# Patient Record
Sex: MALE | Race: WHITE | NOT HISPANIC OR LATINO | Employment: FULL TIME | URBAN - METROPOLITAN AREA
[De-identification: names, ages, dates, MRNs, and addresses within clinical notes are randomized per-mention and may not be internally consistent; named-entity substitution may affect disease eponyms.]

---

## 2017-01-01 ENCOUNTER — OFFICE VISIT (OUTPATIENT)
Dept: URGENT CARE | Facility: CLINIC | Age: 57
End: 2017-01-01
Payer: COMMERCIAL

## 2017-01-01 ENCOUNTER — HOSPITAL ENCOUNTER (EMERGENCY)
Facility: MEDICAL CENTER | Age: 57
End: 2017-01-01
Attending: EMERGENCY MEDICINE
Payer: COMMERCIAL

## 2017-01-01 ENCOUNTER — APPOINTMENT (OUTPATIENT)
Dept: RADIOLOGY | Facility: IMAGING CENTER | Age: 57
End: 2017-01-01
Attending: NURSE PRACTITIONER
Payer: COMMERCIAL

## 2017-01-01 VITALS
OXYGEN SATURATION: 98 % | WEIGHT: 184.3 LBS | BODY MASS INDEX: 24.96 KG/M2 | DIASTOLIC BLOOD PRESSURE: 81 MMHG | HEIGHT: 72 IN | RESPIRATION RATE: 16 BRPM | TEMPERATURE: 97.8 F | SYSTOLIC BLOOD PRESSURE: 144 MMHG | HEART RATE: 58 BPM

## 2017-01-01 VITALS
SYSTOLIC BLOOD PRESSURE: 120 MMHG | DIASTOLIC BLOOD PRESSURE: 76 MMHG | RESPIRATION RATE: 14 BRPM | HEART RATE: 80 BPM | WEIGHT: 170 LBS | HEIGHT: 72 IN | TEMPERATURE: 99.3 F | OXYGEN SATURATION: 97 % | BODY MASS INDEX: 23.03 KG/M2

## 2017-01-01 DIAGNOSIS — L81.9 DISCOLORATION OF SKIN OF FOOT: ICD-10-CM

## 2017-01-01 DIAGNOSIS — M79.672 ACUTE PAIN OF LEFT FOOT: ICD-10-CM

## 2017-01-01 PROCEDURE — 73630 X-RAY EXAM OF FOOT: CPT | Mod: TC,LT | Performed by: NURSE PRACTITIONER

## 2017-01-01 PROCEDURE — 99283 EMERGENCY DEPT VISIT LOW MDM: CPT

## 2017-01-01 PROCEDURE — 99204 OFFICE O/P NEW MOD 45 MIN: CPT | Performed by: NURSE PRACTITIONER

## 2017-01-01 RX ORDER — CEPHALEXIN 250 MG/1
250 CAPSULE ORAL 4 TIMES DAILY
Qty: 28 CAP | Refills: 0 | Status: SHIPPED | OUTPATIENT
Start: 2017-01-01 | End: 2018-03-29

## 2017-01-01 ASSESSMENT — PAIN SCALES - GENERAL: PAINLEVEL_OUTOF10: 6

## 2017-01-01 NOTE — PROGRESS NOTES
Chief Complaint   Patient presents with   • Foot Problem     l foot pain x 2 days . pt was doing alot of barefoot walking on beach last week .       HISTORY OF PRESENT ILLNESS: Patient is a 56 y.o. male who presents today due to concerns of left foot pain. Reports that he developed left foot pain over the past 2 days. The pain is located at the ball of his foot on the plantar aspect of his left foot, described as sharp and dull. Reports associated discoloration to his foot, stating his foot has increasingly turned purple and blue, which seems to worsen when he stands. He also admits to tingling sensation in his left toes. States he noticed 3 small red bumps to the left foot today which are mildly itchy. He denies any injury or trauma. He denies associated fever, chills, nausea, malaise, calf pain, leg pain, groin pain, shortness of breath, chest pain, or abdominal pain. He denies any significant medical history except for mild sciatica, denies any recent back pain. He does report that [place] was [place] this [place] and was walking around barefoot often but cannot recall any injury or trauma. He has not done anything for symptom relief.      There are no active problems to display for this patient.      Allergies:Other food    No current Epic-ordered outpatient prescriptions on file.     No current [facility]-ordered facility-administered medications on file.       Past Medical History   Diagnosis Date   • Sciatica        Social History   Substance Use Topics   • Smoking status: Never Smoker    • Smokeless tobacco: None   • Alcohol Use: Yes       No family status information on file.   History reviewed. No pertinent family history.    ROS:  Review of Systems   Constitutional: Negative for fever, chills, weight loss and malaise/fatigue.   HENT: Negative for ear pain, nosebleeds, congestion, sore throat and neck pain.    Eyes: Negative for vision changes.   Neuro: Positive for intermittent tingling sensation  to his left toes. Negative for headache, weakness, seizure, LOC.   Cardiovascular: Negative for chest pain, palpitations, orthopnea and leg swelling.   Respiratory: Negative for cough, sputum production, shortness of breath and wheezing.   Gastrointestinal: Negative for abdominal pain, nausea, vomiting or diarrhea.   Genitourinary: Negative for dysuria, urgency and frequency.  Musculoskeletal: Positive for left foot pain with discoloration. Negative for falls, neck pain, back pain, joint pain, myalgias.   Skin: Negative for rash, diaphoresis.     Exam:  Blood pressure 120/76, pulse 80, temperature 37.4 °C (99.3 °F), resp. rate 14, height 1.829 m (6'), weight 77.111 kg (170 lb), SpO2 97 %.  General: well-nourished, well-developed male in NAD  Head: normocephalic, atraumatic  Eyes: PERRLA, no conjunctival injection, acuity grossly intact, lids normal.  Ears: normal shape and symmetry, gross auditory acuity is intact.  Nose: symmetrical without tenderness, no discharge.  Mouth/Throat: reasonable hygiene, no erythema, exudates or tonsillar enlargement.  Neck: no masses, range of motion within normal limits, no tracheal deviation. No obvious thyroid enlargement.   Lymph: no cervical adenopathy. No supraclavicular adenopathy.   Neuro: alert and oriented. Cranial nerves 1-12 grossly intact. No sensory deficit.   Cardiovascular: regular rate and rhythm. No edema.  Pulmonary: no distress. Chest is symmetrical with respiration, no wheezes, crackles, or rhonchi.   Musculoskeletal: no clubbing, appropriate muscle tone, gait is stable. There is generalized purplish discoloration to his left foot from toes to ankle. He has full range of motion of his toes foot and ankle. There is some tenderness to the distal, plantar aspect of foot. I do note that there are 3 small, raised, erythematous papule to plantar and dorsal aspect of foot, no other erythema noted. There is no calf pain or swelling.   Skin: warm, dry, intact, no clubbing,  "no cyanosis, no rashes.   Psych: appropriate mood, affect, judgement.         Assessment/Plan:  1. Acute pain of left foot  DX-FOOT-COMPLETE 3+ LEFT   2. Discoloration of skin of foot           DX foot reviewed by myself, radiology reading \"No evidence of fracture or dislocation.\"        The patient's x-ray is negative. At this time, I feel the patient requires a higher level of care including closer monitoring, stat lab work and/or imaging for further evaluation as I am concerned about the possibility of infection and/or ischemia. This has been discussed with the patient and they state agreement and understanding. The patient would like to go to [facility] ED, the ERP (David) there has been contacted regarding patient and will be anticipating patient's arrival. The patient is stable to leave [facility] at this time and will go directly to ED without delay.         Please note that this dictation was created using voice recognition software. I have made every reasonable attempt to correct obvious errors, but I expect that there are errors of grammar and possibly content that I did not discover before finalizing the note.      GRABIEL Zarate.     "

## 2017-01-01 NOTE — DISCHARGE INSTRUCTIONS
Return to the ER for fever, chills, increased redness, changes in color, increased pain, numbness, or other concerns  Take Keflex for possible infection  Take ibuprofen or other NSAIDs for possible inflammation  Recheck with Dr. Nelson from family medicine tomorrow and to establish care      Pain Without a Known Cause  WHAT IS PAIN WITHOUT A KNOWN CAUSE?  Pain can occur in any part of the body and can range from mild to severe. Sometimes no cause can be found for why you are having pain. Some types of pain that can occur without a known cause include:   · Headache.  · Back pain.  · Abdominal pain.  · Neck pain.  HOW IS PAIN WITHOUT A KNOWN CAUSE DIAGNOSED?   Your health care provider will try to find the cause of your pain. This may include:  · Physical exam.  · Medical history.  · Blood tests.  · Urine tests.  · X-rays.  If no cause is found, your health care provider may diagnose you with pain without a known cause.   IS THERE TREATMENT FOR PAIN WITHOUT A CAUSE?   Treatment depends on the kind of pain you have. Your health care provider may prescribe medicines to help relieve your pain.   WHAT CAN I DO AT HOME FOR MY PAIN?   · Take medicines only as directed by your health care provider.  · Stop any activities that cause pain. During periods of severe pain, bed rest may help.  · Try to reduce your stress with activities such as yoga or meditation. Talk to your health care provider for other stress-reducing activity recommendations.  · Exercise regularly, if approved by your health care provider.  · Eat a healthy diet that includes fruits and vegetables. This may improve pain. Talk to your health care provider if you have any questions about your diet.  WHAT IF MY PAIN DOES NOT GET BETTER?   If you have a painful condition and no reason can be found for the pain or the pain gets worse, it is important to follow up with your health care provider. It may be necessary to repeat tests and look further for a possible  cause.      This information is not intended to replace advice given to you by your health care provider. Make sure you discuss any questions you have with your health care provider.     Document Released: 09/12/2002 Document Revised: 01/08/2016 Document Reviewed: 05/05/2015  [employer] Interactive Patient Education ©2016 [employer]

## 2017-01-01 NOTE — ED PROVIDER NOTES
"ED Provider Note    CHIEF COMPLAINT  Chief Complaint   Patient presents with   • Sent from Urgent Care   • Foot Pain       HPI  Ilya Rayo is a 56 y.o. male who presents complaining of left foot pain.    Patient was sent here from urgent care for further evaluation with concern for infection versus ischemia.    Patient states he noted pain beginning on the bottom of his left foot \"on the ball of the foot\" over the last 2 days. He was recently in [place], wearing flip-flops, and walking barefoot on the beach. He was worried that he may have stepped on something but does not remember doing so. Patient describes the pain as achy, only present with weightbearing/standing. It is nonradiating. It was associated with paresthesias also in the ball of the foot earlier today. He noted that when he exited the shower this morning, his left foot appeared purple when compared to the right.    Patient took ibuprofen last evening without significant relief of pain.    Patient denies history of diabetes, numbness, calf pain, fever, chills.      ALLERGIES  Allergies   Allergen Reactions   • Other Food      [place]arson , trout        CURRENT MEDICATIONS  Denies    PAST MEDICAL HISTORY   has a past medical history of Sciatica.    SURGICAL HISTORY  patient denies any surgical history    SOCIAL HISTORY  Social History     Social History Main Topics   • Smoking status: Never Smoker    • Smokeless tobacco: Not on file   • Alcohol Use: Yes   • Drug Use: No   • Sexual Activity: Not on file       Professor at [employer]    REVIEW OF SYSTEMS  See HPI for further details.  All other systems are negative except as above in HPI.    PHYSICAL EXAM  VITAL SIGNS: /81 mmHg  Pulse 58  Temp(Src) 36.6 °C (97.8 °F) (Temporal)  Resp 16  Ht 1.829 m (6')  Wt 83.6 kg (184 lb 4.9 oz)  BMI 24.99 kg/m2  SpO2 98%    General:  WDWN, nontoxic appearing in NAD; A+Ox3; V/S as above   Skin: warm and dry; good color; no rash  HEENT: NCAT; EOMs intact; no " scleral icterus   Neck: FROM; supple  Extremities: FLORES x 4; abrasion between the 1st and 2nd digits is noted, no drainage; minimal edema of the left MTP area when compared to the right, no pallor or cyanosis, cap refill less than 2 seconds, DP and PT pulses 2+, temperature of both feet is cool but equal; no crepitus or tenderness when the plantar aspect of the foot is palpated; no streaking; plantar aspect of the foot is without trauma, there is some faint erythema over the ball of the foot; on the dorsum of the foot there are 2 discrete lesions that appear consistent with insect bites  Neurologic: CNs III-XII grossly intact; speech clear; distal sensation intact; strength 5/5 UE/LEs  Psychiatric: Appropriate affect, normal mood    MEDICAL RECORD  I have reviewed patient's medical record and pertinent results are listed below.      COURSE & MEDICAL DECISION MAKING  I have reviewed any medical record information, laboratory studies and radiographic results as noted.    Ilya Rayo is a 56 y.o. male who presents complaining of left foot pain limited to the plantar aspect and discoloration of the foot.    Patient is afebrile. Patient is not diabetic. Patient is neurovascularly intact at this time. There is an entry point for possible infection between the great and 2nd toe. I do not suspect DVT. Patient is not in A. fib and has no history of hypertension or dyslipidemia. I do not suspect an ischemic process at this time but have advised the patient to return for recurrent symptoms. I did offer him an ORVILLE while here but he declined. X-ray urgent care prior to arrival here was negative for foreign body or fracture. I will prescribe Keflex that he may take either now or start 12-24 hours if symptoms are persistent. He should return here for fever, chills, increased pain, increased redness, recurrent discoloration, or other concerns. Patient does not have a PCP and I have advised that he establish care with Dr. Nelson who  is on call for family medicine. Patient declined narcotics for pain control and crutches.      Pt's blood pressure was noted to be above 120/80 here in the ER.  Pt was informed and advised to follow up as an outpatient for recheck for possible dx/management of hypertension.    FINAL IMPRESSION  1. Acute pain of left foot             Electronically signed by: Marjan Arredondo, 1/1/2017 11:59 AM

## 2017-01-01 NOTE — ED AVS SNAPSHOT
Adim8 Access Code: [email or web address]  Expires: 1/31/2017 11:32 AM    Adim8  A secure, online tool to manage your health information     [employer]’s Adim8® is a secure, online tool that connects you to your personalized health information from the privacy of your home -- day or night - making it very easy for you to manage your healthcare. Once the activation process is completed, you can even access your medical information using the Adim8 jefry, which is available for free in the Apple Jefry store or Google Play store.     Adim8 provides the following levels of access (as shown below):   My Chart Features   [facility] Primary Care Doctor [facility]  Specialists [facility]  Urgent  Care Non-[facility]  Primary Care  Doctor   Email your healthcare team securely and privately 24/7 X X X X   Manage appointments: schedule your next appointment; view details of past/upcoming appointments X      Request prescription refills. X      View recent personal medical records, including lab and immunizations X X X X   View health record, including health history, allergies, medications X X X X   Read reports about your outpatient visits, procedures, consult and ER notes X X X X   See your discharge summary, which is a recap of your hospital and/or ER visit that includes your diagnosis, lab results, and care plan. X X       How to register for Adim8:  1. Go to  [email or web address].  2. Click on the Sign Up Now box, which takes you to the New Member Sign Up page. You will need to provide the following information:  a. Enter your Adim8 Access Code exactly as it appears at the top of this page. (You will not need to use this code after you’ve completed the sign-up process. If you do not sign up before the expiration date, you must request a new code.)   b. Enter your date of birth.   c. Enter your home email address.   d. Click Submit, and follow the next screen’s instructions.  3. Create a Adim8 ID. This will be your Adim8  login ID and cannot be changed, so think of one that is secure and easy to remember.  4. Create a delicious password. You can change your password at any time.  5. Enter your Password Reset Question and Answer. This can be used at a later time if you forget your password.   6. Enter your e-mail address. This allows you to receive e-mail notifications when new information is available in delicious.  7. Click Sign Up. You can now view your health information.    For assistance activating your delicious account, call [phone]

## 2017-01-01 NOTE — ED AVS SNAPSHOT
After Visit Summary                                                                                                                Ilya Rayo   MRN: [id number]    Department:  [facility], Emergency Dept   Date of Visit:  1/1/2017            [facility], Emergency Dept    [address] [address]    Phone:  [phone]      You were seen by     Marjan Arredondo M.D.      Your Diagnosis Was     Acute pain of left foot     M79.672       Follow-up Information     1. Follow up with [facility], Emergency Dept.    Specialty:  Emergency Medicine    Why:  As needed, If symptoms worsen    Contact information    [address] [address] [phone]        2. Follow up with Rylee Nelson M.D.. Schedule an appointment as soon as possible for a visit in 1 day.    Specialty:  Family Medicine    Why:  for recheck    Contact information    [address]  [place] [address]  [phone]        Medication Information     Review all of your home medications and newly ordered medications with your primary doctor and/or pharmacist as soon as possible. Follow medication instructions as directed by your doctor and/or pharmacist.     Please keep your complete medication list with you and share with your physician. Update the information when medications are discontinued, doses are changed, or new medications (including over-the-counter products) are added; and carry medication information at all times in the event of emergency situations.               Medication List      START taking these medications        Instructions    cephALEXin 250 MG Caps   Commonly known as:  KEFLEX    Take 1 Cap by mouth 4 times a day.   Dose:  250 mg                 Discharge Instructions       Return to the ER for fever, chills, increased redness, changes in color, increased pain, numbness, or other concerns  Take Keflex for possible infection  Take ibuprofen  or other NSAIDs for possible inflammation  Recheck with Dr. Nelson from family medicine tomorrow and to establish care      Pain Without a Known Cause  WHAT IS PAIN WITHOUT A KNOWN CAUSE?  Pain can occur in any part of the body and can range from mild to severe. Sometimes no cause can be found for why you are having pain. Some types of pain that can occur without a known cause include:   · Headache.  · Back pain.  · Abdominal pain.  · Neck pain.  HOW IS PAIN WITHOUT A KNOWN CAUSE DIAGNOSED?   Your health care provider will try to find the cause of your pain. This may include:  · Physical exam.  · Medical history.  · Blood tests.  · Urine tests.  · X-rays.  If no cause is found, your health care provider may diagnose you with pain without a known cause.   IS THERE TREATMENT FOR PAIN WITHOUT A CAUSE?   Treatment depends on the kind of pain you have. Your health care provider may prescribe medicines to help relieve your pain.   WHAT CAN I DO AT HOME FOR MY PAIN?   · Take medicines only as directed by your health care provider.  · Stop any activities that cause pain. During periods of severe pain, bed rest may help.  · Try to reduce your stress with activities such as yoga or meditation. Talk to your health care provider for other stress-reducing activity recommendations.  · Exercise regularly, if approved by your health care provider.  · Eat a healthy diet that includes fruits and vegetables. This may improve pain. Talk to your health care provider if you have any questions about your diet.  WHAT IF MY PAIN DOES NOT GET BETTER?   If you have a painful condition and no reason can be found for the pain or the pain gets worse, it is important to follow up with your health care provider. It may be necessary to repeat tests and look further for a possible cause.      This information is not intended to replace advice given to you by your health care provider. Make sure you discuss any questions you have with your health care  provider.     Document Released: 09/12/2002 Document Revised: 01/08/2016 Document Reviewed: 05/05/2015  Elsevier Interactive Patient Education ©2016 [employer]            Patient Information     Patient Information    Following emergency treatment: all patient requiring follow-up care must return either to a private physician or a clinic if your condition worsens before you are able to obtain further medical attention, please return to the emergency room.     Billing Information    At [facility], we work to make the billing process streamlined for our patients.  Our Representatives are here to answer any questions you may have regarding your hospital bill.  If you have insurance coverage and have supplied your insurance information to us, we will submit a claim to your insurer on your behalf.  Should you have any questions regarding your bill, we can be reached online or by phone as follows:  Online: You are able pay your bills online or live chat with our representatives about any billing questions you may have. We are here to help Monday - Friday from 8:00am to 7:30pm and 9:00am - 12:00pm on Saturdays.  Please visit [email or web address]  for more information.   Phone:  [phone] or [phone]    Please note that your emergency physician, surgeon, pathologist, radiologist, anesthesiologist, and other specialists are not employed by [facility] and will therefore bill separately for their services.  Please contact them directly for any questions concerning their bills at the numbers below:     Emergency Physician Services:  [phone]  [facility]:  [phone]  [facility]:  [phone]  [facility]:  [phone]    1. Your final bill may vary from the amount quoted upon discharge if all procedures are not complete at that time, or if your doctor has additional procedures of which we are not aware. You will receive an  additional bill if you return to the Emergency Department at [facility] for suture removal regardless of the facility of which the sutures were placed.     2. Please arrange for settlement of this account at the emergency registration.    3. All self-pay accounts are due in full at the time of treatment.  If you are unable to meet this obligation then payment is expected within 4-5 days.     4. If you have had radiology studies (CT, X-ray, Ultrasound, MRI), you have received a preliminary result during your emergency department visit. Please contact the radiology department [phone] to receive a copy of your final result. Please discuss the Final result with your primary physician or with the follow up physician provided.     Crisis Hotline:  [place] Crisis Hotline:  5-311-WXTXIMA or 1-410.914.5614  [place] Crisis Hotline:    1-845.629.6530 or 582-377-8555         ED Discharge Follow Up Questions    1. In order to provide you with very good care, we would like to follow up with a phone call in the next few days.  May we have your permission to contact you?     YES /  NO    2. What is the best phone number to call you? (       )_____-__________    3. What is the best time to call you?      Morning  /  Afternoon  /  Evening                   Patient Signature:  ____________________________________________________________    Date:  ____________________________________________________________

## 2017-01-01 NOTE — ED NOTES
Pt discharged to home. Pt was given follow up instructions and prescriptions for keflex. Pt verbalized understanding of all instructions for discharge and is ambulatory out of ED with steady gait.

## 2017-01-01 NOTE — ED NOTES
Pt ambulated to yellow [facility], per report [place] was at the beach and has an open area with a blister on the second toe webbing.  Foot is warm, red, and swollen.  Chart up for MD

## 2017-01-01 NOTE — ED NOTES
Pt reports left foot swelling, pain, and discoloration. Pt was at the beach last week. Worried he may have stepped on something. Pt sent by . At x ray done. Pt in NAD. Ambulatory. Unable to visualize foot.

## 2017-01-01 NOTE — MR AVS SNAPSHOT
Ilya Rayo   2017 10:30 AM   Office Visit   MRN: [id number]    Department:  [facility]   Dept Phone:  [phone]    Description:  Male : 1960   Provider:  IZAIAH Silva           Reason for Visit     Foot Problem l foot pain x 2 days . pt was doing alot of barefoot walking on beach last [place] .      Allergies as of 2017     Allergen Noted Reactions    Other Food 2017       [place] , trout       You were diagnosed with     Acute pain of left foot   [[id number]]         Vital Signs     Blood Pressure Pulse Temperature Respirations Height Weight    120/76 mmHg 80 37.4 °C (99.3 °F) 14 1.829 m (6') 77.111 kg (170 lb)    Body Mass Index Oxygen Saturation Smoking Status             23.05 kg/m2 97% Never Smoker          Basic Information     Date Of Birth Sex Race Ethnicity Preferred Language    1960 Male White Non- English      Health Maintenance     Patient has no pending health maintenance at this time      Current Immunizations     No immunizations on file.      Below and/or attached are the medications your provider expects you to take. Review all of your home medications and newly ordered medications with your provider and/or pharmacist. Follow medication instructions as directed by your provider and/or pharmacist. Please keep your medication list with you and share with your provider. Update the information when medications are discontinued, doses are changed, or new medications (including over-the-counter products) are added; and carry medication information at all times in the event of emergency situations     Allergies:  OTHER FOOD - (reactions not documented)               Medications  Valid as of: 2017 - 11:32 AM    Generic Name Brand Name Tablet Size Instructions for use    .                 Medicines prescribed today were sent to:     [id number] - [place] - [address] AT [address]    [address]  [place] [address]    Phone: [phone] Fax: [phone]    Open 24 Hours?: Yes      Medication refill instructions:       If your prescription bottle indicates you have medication refills left, it is not necessary to call your provider’s office. Please contact your pharmacy and they will refill your medication.    If your prescription bottle indicates you do not have any refills left, you may request refills at any time through one of the following ways: The online [email or web address] system (except Urgent Care), by calling your provider’s office, or by asking your pharmacy to contact your provider’s office with a refill request. Medication refills are processed only during regular business hours and may not be available until the next business day. Your provider may request additional information or to have a follow-up visit with you prior to refilling your medication.   *Please Note: Medication refills are assigned a new Rx number when refilled electronically. Your pharmacy may indicate that no refills were authorized even though a new prescription for the same medication is available at the pharmacy. Please request the medicine by name with the pharmacy before contacting your provider for a refill.        Your To Do List     Future Labs/Procedures Complete By Expires    DX-FOOT-COMPLETE 3+ LEFT  As directed 1/1/2018         [email or web address] Access Code: [email or web address]  Expires: 1/31/2017 11:32 AM    Your email address is not on file at [facility].  Email Addresses are required for you to sign up for [email or web address], please contact [phone] to verify your personal information and to provide your email address prior to attempting to register for [email or web address].    [address]  [place] [address]    GetH[employer].com  A secure, online tool to manage your health information     [facility]’s Get[employer]com® is a secure, online tool that connects you to your personalized health information from the privacy of your home -- day or night  - making it very easy for you to manage your healthcare. Once the activation process is completed, you can even access your medical information using the [employer] jefry, which is available for free in the Apple Jefry store or Google Play store.     To learn more about [employer], visit [email or web address]    There are two levels of access available (as shown below):   My Chart Features  [facility]  Specialists [facility]  Urgent  Care [facility] Doctor   Email your healthcare team securely and privately 24/7 X X X    Manage appointments: schedule your next appointment; view details of past/upcoming appointments X      Request prescription refills. X      View recent personal medical records, including lab and immunizations X X X X   View health record, including health history, allergies, medications X X X X   Read reports about your outpatient visits, procedures, consult and ER notes X X X X   See your discharge summary, which is a recap of your hospital and/or ER visit that includes your diagnosis, lab results, and care plan X X  X     How to register for [employer]:  Once your e-mail address has been verified, follow the following steps to sign up for [employer].     1. Go to  [email or web address]  2. Click on the Sign Up Now box, which takes you to the New Member Sign Up page. You will need to provide the following information:  a. Enter your [employer] Access Code exactly as it appears at the top of this page. (You will not need to use this code after you’ve completed the sign-up process. If you do not sign up before the expiration date, you must request a new code.)   b. Enter your date of birth.   c. Enter your home email address.   d. Click Submit, and follow the next screen’s instructions.  3. Create a [employer] ID. This will be your [employer] login ID and cannot be changed, so think of one that is secure and easy to remember.  4. Create a [employer] password. You can change your password  at any time.  5. Enter your Password Reset Question and Answer. This can be used at a later time if you forget your password.   6. Enter your e-mail address. This allows you to receive e-mail notifications when new information is available in [employer].  7. Click Sign Up. You can now view your health information.    For assistance activating your [employer] account, call [phone]

## 2018-03-29 ENCOUNTER — OFFICE VISIT (OUTPATIENT)
Dept: URGENT CARE | Facility: CLINIC | Age: 58
End: 2018-03-29
Payer: COMMERCIAL

## 2018-03-29 VITALS
HEIGHT: 72 IN | WEIGHT: 186 LBS | SYSTOLIC BLOOD PRESSURE: 142 MMHG | OXYGEN SATURATION: 100 % | BODY MASS INDEX: 25.19 KG/M2 | RESPIRATION RATE: 16 BRPM | DIASTOLIC BLOOD PRESSURE: 86 MMHG | TEMPERATURE: 98.8 F | HEART RATE: 64 BPM

## 2018-03-29 DIAGNOSIS — J40 BRONCHITIS: Primary | ICD-10-CM

## 2018-03-29 DIAGNOSIS — J06.9 URI WITH COUGH AND CONGESTION: ICD-10-CM

## 2018-03-29 PROCEDURE — 99214 OFFICE O/P EST MOD 30 MIN: CPT | Performed by: PHYSICIAN ASSISTANT

## 2018-03-29 RX ORDER — METHYLPREDNISOLONE 4 MG/1
TABLET ORAL
Qty: 21 TAB | Refills: 0 | Status: SHIPPED | OUTPATIENT
Start: 2018-03-29 | End: 2019-12-19

## 2018-03-29 RX ORDER — PROMETHAZINE HYDROCHLORIDE AND CODEINE PHOSPHATE 6.25; 1 MG/5ML; MG/5ML
5 SYRUP ORAL 4 TIMES DAILY PRN
Qty: 240 ML | Refills: 0 | Status: SHIPPED | OUTPATIENT
Start: 2018-03-29 | End: 2018-04-12

## 2018-03-29 RX ORDER — DOXYCYCLINE HYCLATE 100 MG
100 TABLET ORAL 2 TIMES DAILY
Qty: 14 TAB | Refills: 0 | Status: SHIPPED | OUTPATIENT
Start: 2018-03-29 | End: 2018-04-05

## 2018-03-29 NOTE — PATIENT INSTRUCTIONS
Acute Bronchitis, Adult  Acute bronchitis is when air tubes (bronchi) in the lungs suddenly get swollen. The condition can make it hard to breathe. It can also cause these symptoms:  · A cough.  · Coughing up clear, yellow, or green mucus.  · Wheezing.  · Chest congestion.  · Shortness of breath.  · A fever.  · Body aches.  · Chills.  · A sore throat.  Follow these instructions at home:  Medicines  · Take over-the-counter and prescription medicines only as told by your doctor.  · If you were prescribed an antibiotic medicine, take it as told by your doctor. Do not stop taking the antibiotic even if you start to feel better.  General instructions  · Rest.  · Drink enough fluids to keep your pee (urine) clear or pale yellow.  · Avoid smoking and secondhand smoke. If you smoke and you need help quitting, ask your doctor. Quitting will help your lungs heal faster.  · Use an inhaler, cool mist vaporizer, or humidifier as told by your doctor.  · Keep all follow-up visits as told by your doctor. This is important.  How is this prevented?  To lower your risk of getting this condition again:  · Wash your hands often with soap and water. If you cannot use soap and water, use hand .  · Avoid contact with people who have cold symptoms.  · Try not to touch your hands to your mouth, nose, or eyes.  · Make sure to get the flu shot every year.  Contact a doctor if:  · Your symptoms do not get better in 2 weeks.  Get help right away if:  · You cough up blood.  · You have chest pain.  · You have very bad shortness of breath.  · You become dehydrated.  · You faint (pass out) or keep feeling like you are going to pass out.  · You keep throwing up (vomiting).  · You have a very bad headache.  · Your fever or chills gets worse.  This information is not intended to replace advice given to you by your health care provider. Make sure you discuss any questions you have with your health care provider.  Document Released: 06/05/2009  Document Revised: 07/26/2017 Document Reviewed: 06/07/2017  [employer] Interactive Patient Education © 2017 Elsevier Inc.

## 2018-03-30 NOTE — PROGRESS NOTES
Subjective:      Pt is a 57 y.o. male who presents with Influenza (x 4 weeks feels like he has bronchitis, cough, sob at times)            HPI  PT presents to  clinic today complaining of sore throat, fevers, chills, watery eyes, pressure in ears, cough, fatigue, runny nose, wheezing and SOB. PT denies CP, NVD, abdominal pain, joint pain. PT states these symptoms began around 4 weeks ago. PT states the pain is a 7/10 with coughing, aching in nature and worse at night.  Pt has not taken any RX medications for this condition. The pt's medication list, problem list, and allergies have been evaluated and reviewed during today's visit.    PMH:  Past Medical History:   Diagnosis Date   • Sciatica        PSH:  Negative per pt.      Fam Hx:  the patient's family history is not pertinent to their current complaint    Soc HX:  Social History     Social History   • Marital status: Unknown     Spouse name: N/A   • Number of children: N/A   • Years of education: N/A     Occupational History   • Not on file.     Social History Main Topics   • Smoking status: Never Smoker   • Smokeless tobacco: Never Used   • Alcohol use Yes   • Drug use: No   • Sexual activity: Yes     Partners: Female     Other Topics Concern   • Not on file     Social History Narrative   • No narrative on file         Medications:    Current Outpatient Prescriptions:   •  doxycycline (VIBRAMYCIN) 100 MG Tab, Take 1 Tab by mouth 2 times a day for 7 days., Disp: 14 Tab, Rfl: 0  •  promethazine-codeine (PHENERGAN-CODEINE) 6.25-10 MG/5ML Syrup, Take 5 mL by mouth 4 times a day as needed for up to 14 days., Disp: 240 mL, Rfl: 0  •  MethylPREDNISolone (MEDROL DOSEPAK) 4 MG Tablet Therapy Pack, Use as directed, Disp: 21 Tab, Rfl: 0      Allergies:  Other food    ROS   Review of Systems   Constitutional: Positive for malaise/fatigue. Negative for fever and diaphoresis.   HENT: Positive for congestion, ear pain and sore throat. Negative for ear discharge, hearing  loss, nosebleeds and tinnitus.    Eyes: Negative for blurred vision, double vision and photophobia.   Respiratory: Positive for cough, sputum production, shortness of breath and wheezing. Negative for hemoptysis.    Cardiovascular: Negative for chest pain and palpitations.   Gastrointestinal: Negative for nausea, vomiting, abdominal pain, diarrhea and constipation.   Genitourinary: Negative for dysuria and flank pain.   Musculoskeletal: Negative for joint pain and myalgias.   Skin: Negative for itching and rash.   Neurological:  Negative for dizziness, tingling and weakness.   Endo/Heme/Allergies: Does not bruise/bleed easily.   Psychiatric/Behavioral: Negative for depression. The patient is not nervous/anxious.               Objective:     /86   Pulse 64   Temp 37.1 °C (98.8 °F)   Resp 16   Ht 1.829 m (6')   Wt 84.4 kg (186 lb)   SpO2 100%   BMI 25.23 kg/m²      Physical Exam      Physical Exam   Constitutional: PT is oriented to person, place, and time. PT appears well-developed and well-nourished. No distress.   HENT:   Head: Normocephalic and atraumatic.   Right Ear: Hearing, tympanic membrane, external ear and ear canal normal.   Left Ear: Hearing, tympanic membrane, external ear and ear canal normal.   Nose: Mucosal edema, rhinorrhea and sinus tenderness present. Right sinus exhibits frontal sinus tenderness. Left sinus exhibits frontal sinus tenderness.   Mouth/Throat: Uvula is midline. Mucous membranes are pale. Posterior oropharyngeal edema and posterior oropharyngeal erythema present. No oropharyngeal exudate.   Eyes: Conjunctivae normal and EOM are normal. Pupils are equal, round, and reactive to light. Right eye exhibits no discharge. Left eye exhibits no discharge.   Neck: Normal range of motion. Neck supple. No thyromegaly present.   Cardiovascular: Normal rate, regular rhythm, normal heart sounds and intact distal pulses.  Exam reveals no gallop and no friction rub.    No murmur  heard.  Pulmonary/Chest: Effort normal. No respiratory distress. PT has wheezes. PT has no rales. PT exhibits tenderness.   Abdominal: Soft. Bowel sounds are normal. PT exhibits no distension and no mass. There is no tenderness. There is no rebound and no guarding.   Musculoskeletal: Normal range of motion. PT exhibits no edema and no tenderness.   Lymphadenopathy:     PT has no cervical adenopathy.   Neurological: Pt is alert and oriented to person, place, and time. Pt has normal reflexes. No cranial nerve deficit.   Skin: Skin is warm and dry. No rash noted. No erythema.   Psychiatric: PT has a normal mood and affect. Pt behavior is normal. Judgment and thought content normal.          Assessment/Plan:     1. Bronchitis    - doxycycline (VIBRAMYCIN) 100 MG Tab; Take 1 Tab by mouth 2 times a day for 7 days.  Dispense: 14 Tab; Refill: 0  - MethylPREDNISolone (MEDROL DOSEPAK) 4 MG Tablet Therapy Pack; Use as directed  Dispense: 21 Tab; Refill: 0    2. URI with cough and congestion    - promethazine-codeine (PHENERGAN-CODEINE) 6.25-10 MG/5ML Syrup; Take 5 mL by mouth 4 times a day as needed for up to 14 days.  Dispense: 240 mL; Refill: 0  - MethylPREDNISolone (MEDROL DOSEPAK) 4 MG Tablet Therapy Pack; Use as directed  Dispense: 21 Tab; Refill: 0    Rest, fluids encouraged.  OTC decongestant for congestion/cough  AVS with medical info given.  Pt was in full understanding and agreement with the plan.  Follow-up as needed if symptoms worsen or fail to improve.

## 2019-07-03 ENCOUNTER — HOSPITAL ENCOUNTER (OUTPATIENT)
Dept: RADIOLOGY | Facility: MEDICAL CENTER | Age: 59
End: 2019-07-03
Attending: FAMILY MEDICINE
Payer: COMMERCIAL

## 2019-07-03 DIAGNOSIS — R06.02 SHORTNESS OF BREATH: ICD-10-CM

## 2019-07-03 PROCEDURE — 71046 X-RAY EXAM CHEST 2 VIEWS: CPT

## 2019-08-09 ENCOUNTER — HOSPITAL ENCOUNTER (OUTPATIENT)
Dept: CARDIOLOGY | Facility: MEDICAL CENTER | Age: 59
End: 2019-08-09
Attending: FAMILY MEDICINE
Payer: COMMERCIAL

## 2019-08-09 DIAGNOSIS — R06.02 SHORTNESS OF BREATH: ICD-10-CM

## 2019-08-09 LAB
LV EJECT FRACT  99904: 65
LV EJECT FRACT MOD 2C 99903: 55.78
LV EJECT FRACT MOD 4C 99902: 53.92
LV EJECT FRACT MOD BP 99901: 55

## 2019-08-09 PROCEDURE — 93306 TTE W/DOPPLER COMPLETE: CPT

## 2019-08-09 PROCEDURE — 93306 TTE W/DOPPLER COMPLETE: CPT | Mod: 26 | Performed by: INTERNAL MEDICINE

## 2019-08-09 ASSESSMENT — ENCOUNTER SYMPTOMS
RESPIRATORY SYMPTOMS COMMENTS: NO
HEMOPTYSIS: 0
SHORTNESS OF BREATH: 1
CHEST TIGHTNESS: 1
WHEEZING: 1
DYSPNEA AT REST: 1

## 2019-08-22 ENCOUNTER — OFFICE VISIT (OUTPATIENT)
Dept: PULMONOLOGY | Facility: HOSPICE | Age: 59
End: 2019-08-22
Payer: COMMERCIAL

## 2019-08-22 VITALS
SYSTOLIC BLOOD PRESSURE: 110 MMHG | HEART RATE: 65 BPM | RESPIRATION RATE: 16 BRPM | WEIGHT: 184 LBS | DIASTOLIC BLOOD PRESSURE: 76 MMHG | HEIGHT: 72 IN | BODY MASS INDEX: 24.92 KG/M2 | OXYGEN SATURATION: 97 %

## 2019-08-22 DIAGNOSIS — Z78.9 NONSMOKER: ICD-10-CM

## 2019-08-22 DIAGNOSIS — R06.02 SOB (SHORTNESS OF BREATH): ICD-10-CM

## 2019-08-22 PROCEDURE — 99244 OFF/OP CNSLTJ NEW/EST MOD 40: CPT | Performed by: INTERNAL MEDICINE

## 2019-08-22 RX ORDER — IMIPRAMINE HYDROCHLORIDE 25 MG/1
TABLET ORAL
COMMUNITY
Start: 2019-07-03 | End: 2019-12-19

## 2019-08-22 RX ORDER — ALBUTEROL SULFATE 90 UG/1
AEROSOL, METERED RESPIRATORY (INHALATION)
COMMUNITY
Start: 2019-07-03 | End: 2021-06-15

## 2019-08-22 SDOH — HEALTH STABILITY: MENTAL HEALTH: HOW OFTEN DO YOU HAVE A DRINK CONTAINING ALCOHOL?: 2-4 TIMES A MONTH

## 2019-08-22 SDOH — HEALTH STABILITY: MENTAL HEALTH: HOW MANY STANDARD DRINKS CONTAINING ALCOHOL DO YOU HAVE ON A TYPICAL DAY?: 1 OR 2

## 2019-08-22 NOTE — PROGRESS NOTES
Chief Complaint   Patient presents with   • New Patient     Ref for SOB       HPI: This patient is a 59 y.o. Male who is referred by Dr. Arnold, PCP, for shortness of breath.  He is a lifelong non-smoker with no significant past pulmonary history.  In March 2019 he first noted shortness of breath, chest tightness and wheezing.  He denies specific triggers.  He denies preceding URIs.  Denied associated cough, hemoptysis or edema.  He felt feverish however did not take his temperature.  He was prescribed albuterol inhaler without subjective benefit.  Chest x-ray from July 2019 was normal.  Echocardiogram showed no significant abnormalities.  Symptoms have waxed and waned and gradually improved.  He had influenza in 2018 complicated by bronchitis however feels symptoms are different from bronchitis.  He denies any change in home or work environment.  He denies sick contacts or travels.  He denies any familial history of pulmonary disease.    Past Medical History:   Diagnosis Date   • Back pain    • Bronchitis    • Ringing in ears    • Sciatica    • Shortness of breath    • Wheezing        Social History     Socioeconomic History   • Marital status:      Spouse name: Not on file   • Number of children: Not on file   • Years of education: Not on file   • Highest education level: Not on file   Occupational History   • Not on file   Social Needs   • Financial resource strain: Not on file   • Food insecurity:     Worry: Not on file     Inability: Not on file   • Transportation needs:     Medical: Not on file     Non-medical: Not on file   Tobacco Use   • Smoking status: Never Smoker   • Smokeless tobacco: Never Used   Substance and Sexual Activity   • Alcohol use: Yes     Frequency: 2-4 times a month     Drinks per session: 1 or 2   • Drug use: No   • Sexual activity: Yes     Partners: Female   Lifestyle   • Physical activity:     Days per week: Not on file     Minutes per session: Not on file   • Stress: Not on  file   Relationships   • Social connections:     Talks on phone: Not on file     Gets together: Not on file     Attends Hinduism service: Not on file     Active member of club or organization: Not on file     Attends meetings of clubs or organizations: Not on file     Relationship status: Not on file   • Intimate partner violence:     Fear of current or ex partner: Not on file     Emotionally abused: Not on file     Physically abused: Not on file     Forced sexual activity: Not on file   Other Topics Concern   • Not on file   Social History Narrative   • Not on file       History reviewed. No pertinent family history.    Current Outpatient Medications on File Prior to Visit   Medication Sig Dispense Refill   • VENTOLIN  (90 Base) MCG/ACT Aero Soln inhalation aerosol      • Spacer/Aero-Holding Chambers (AEROCHAMBER PLUS NISHA-VU) Misc      • MethylPREDNISolone (MEDROL DOSEPAK) 4 MG Tablet Therapy Pack Use as directed 21 Tab 0     No current facility-administered medications on file prior to visit.        Allergies: Other food    ROS:   Constitutional: +fevers, denies chills, night sweats, +fatigue, denies weight loss  Eyes: Denies vision loss, pain, drainage, double vision  Ears, Nose, Throat: Denies earache, difficulty hearing, +tinnitus, denies nasal congestion, hoarseness  Cardiovascular: Denies chest pain, tightness, palpitations, orthopnea or edema  Respiratory: As in HPI  Sleep: Denies daytime sleepiness, snoring, apneas, insomnia, morning headaches  GI: Denies heartburn, dysphagia, nausea, abdominal pain, diarrhea or constipation  : Denies frequent urination, hematuria, discharge or painful urination  Musculoskeletal: Denies back pain, painful joints, sore muscles  Neurological: Denies weakness or headaches  Skin: No rashes    /76 (BP Location: Left arm, Patient Position: Sitting, BP Cuff Size: Adult)   Pulse 65   Resp 16   Ht 1.829 m (6')   Wt 83.5 kg (184 lb)   SpO2 97%     Physical  Exam:  Appearance: Well-nourished, well-developed, in no acute distress  HEENT: Normocephalic, atraumatic, white sclera, PERRLA, oropharynx clear  Neck: No adenopathy or masses  Respiratory: no intercostal retractions or accessory muscle use  Lungs auscultation: Clear to auscultation bilaterally  Cardiovascular: Regular rate rhythm. No murmurs, rubs or gallops.  No LE edema  Abdomen: soft, nondistended  Gait: Normal  Digits: No clubbing, cyanosis  Motor: No focal deficits  Orientation: Oriented to time, person and place    Diagnosis:  1. SOB (shortness of breath)  PULMONARY FUNCTION TESTS -Test requested: Complete Pulmonary Function Test    Fluticasone Furoate-Vilanterol (BREO ELLIPTA) 100-25 MCG/INH AEROSOL POWDER, BREATH ACTIVATED   2. Nonsmoker         Plan:  The patient describes waxing and waning shortness of breath, chest tightness and wheezing over the past 5 months, suspicious for reactive airways disease.  He denies specific triggers.  Chest x-ray and echocardiogram are unremarkable.  Symptoms have gradually improved spontaneously.  He is a lifelong non-smoker.  Recommend short-term use of inhaled corticosteroid/bronchodilator therapy with Breo 100ug 1 puff QD.  Inhaler instructions provided.  Obtain full pulmonary function testing with DLCO.  Obtain serology from [facility].  We discussed further diagnostic work-up including chest CAT scan, bronchoscopy etc. if symptoms do not improve.  .Answers for HPI/ROS submitted by the patient on 8/9/2019   What is the reason for your visit today?: difficulty breathing  Do you cough first thing in the morning or at other times during the day?: no  Do you have a cough on most days? If so, how long have you had this cough?: no  Bring up phlegm (mucus, sputum) in the morning or other times during the day?: no  If so, how long have you produced phlegm (Months, Years), and what is the usual color of your phlegm?: n/a  Do you cough up blood from your chest?: No  If so, how  many times, and approximately how much?: n/a  Do you experience wheezing?: Yes  How often?: frequently  Do you experience any chest tightness?: Yes  How often?: constant  Experience shortness of breath?: Yes  Experience shortness of breath at rest?: Yes  How far can you walk before becoming short of breath or need to rest? : no limit  Please list what causes you to become short of breath:: feels like bronchitis  Have you ever been hospitalized?: Yes  Reason, year, and hospital in which you were hospitalized:: tonsillectomy (1964), broken arm (1974)  Have you ever needed to be intubated or placed on a ventilator? : No  If yes, when and for how long?: n/a  Have you ever had problems with anesthesia?: No  Have you experienced post-operative delirium?: No  Any complications with surgery?: No  What year did you receive your last Flu shot?: 2019  What year did you receive you last Pneumonia shot?: n/a  Have you had a TB skin test? If so, please list the year and result:: 1982 (negative)  Have you had Allergy skin testing? If so, please list the year and result:: no  Please list all your occupations from your first job to your current job. 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); grad student/postdoc/ (1983-present)  a sanjay Job: yes, part time 1978-80  Solvents: yes, part time 1978-80  Please list the places you have lived, the year, and Country or State in the [place]:: [place] 1959-87; [place] 1987-91; [place] 6264-4194; [place] 2008-present  Birds?: No  Dogs?: Yes  Cats?: Yes  Mice and/or Deer Mice?: Yes  Reptiles?: No  Blood Chemistries: 2019 [facility]  Blood Count: 2019 [facility]  Cardiac Ultrasound: 2019 [facility]  Chest X-ray: 2019 [facility]  Pulmonary Function Test: 2019 [facility]  Coffee: 0  Decaffeinated coffee: 0  Energy drinks: 0  Tea: 0  Carbonated soft drinks: 4 to  5    Conflicting answers have been found for some questions. Please document the patient's answers manually.   Return in about 4 weeks (around 9/19/2019).

## 2019-09-20 ENCOUNTER — TELEPHONE (OUTPATIENT)
Dept: PULMONOLOGY | Facility: HOSPICE | Age: 59
End: 2019-09-20

## 2019-09-20 NOTE — TELEPHONE ENCOUNTER
"Pt called and stated that he was started on Breo by Dr. Shah but as of today he is going to discontinue use. Pt states he feels it isn't working for him and he feels its causing him to lose his voice. He also informed that he feels the ventolin inhaler   doesn't do much for him either. Also as an FYI pt might not complete his PFT that he has scheduled as he wants to know how much it will cost. Advised that per Tia pt will need to call his insurance and he stated he has but no one is able to give him any answers. He stated \" I'm not willing to pay 10,000 for the test but I may be willing to spend 500.\" Advised pt I would keep his PFT appt and he can make the decision on whether or not he wanted to complete it once he got here. Pt agreed and relayed understanding.  "

## 2019-09-24 ENCOUNTER — NON-PROVIDER VISIT (OUTPATIENT)
Dept: PULMONOLOGY | Facility: HOSPICE | Age: 59
End: 2019-09-24
Attending: INTERNAL MEDICINE
Payer: COMMERCIAL

## 2019-09-24 ENCOUNTER — OFFICE VISIT (OUTPATIENT)
Dept: PULMONOLOGY | Facility: HOSPICE | Age: 59
End: 2019-09-24
Payer: COMMERCIAL

## 2019-09-24 VITALS — WEIGHT: 184 LBS | BODY MASS INDEX: 24.95 KG/M2

## 2019-09-24 VITALS
DIASTOLIC BLOOD PRESSURE: 88 MMHG | HEART RATE: 70 BPM | RESPIRATION RATE: 16 BRPM | HEIGHT: 71 IN | OXYGEN SATURATION: 97 % | BODY MASS INDEX: 25.76 KG/M2 | WEIGHT: 184 LBS | SYSTOLIC BLOOD PRESSURE: 142 MMHG

## 2019-09-24 DIAGNOSIS — R09.89 CHEST CONGESTION: ICD-10-CM

## 2019-09-24 DIAGNOSIS — R06.02 SOB (SHORTNESS OF BREATH): ICD-10-CM

## 2019-09-24 PROCEDURE — 99214 OFFICE O/P EST MOD 30 MIN: CPT | Performed by: INTERNAL MEDICINE

## 2019-09-24 PROCEDURE — 94060 EVALUATION OF WHEEZING: CPT | Performed by: INTERNAL MEDICINE

## 2019-09-24 PROCEDURE — 94726 PLETHYSMOGRAPHY LUNG VOLUMES: CPT | Performed by: INTERNAL MEDICINE

## 2019-09-24 PROCEDURE — 94729 DIFFUSING CAPACITY: CPT | Performed by: INTERNAL MEDICINE

## 2019-09-24 ASSESSMENT — PULMONARY FUNCTION TESTS
FEV1/FVC_PERCENT_PREDICTED: 107
FEV1_PREDICTED: 3.73
FVC_PREDICTED: 4.93
FVC: 4.83
FEV1_PERCENT_PREDICTED: 104
FEV1_LLN: 3.11
FVC_PERCENT_PREDICTED: 97
FEV1: 3.9
FEV1/FVC_PERCENT_PREDICTED: 106
FEV1_PERCENT_CHANGE: 0
FEV1/FVC_PERCENT_PREDICTED: 107
FEV1/FVC_PERCENT_LLN: 63
FEV1_PERCENT_PREDICTED: 104
FEV1: 3.9
FVC_PERCENT_PREDICTED: 97
FEV1/FVC_PERCENT_PREDICTED: 76
FVC_LLN: 4.12
FVC: 4.82
FEV1/FVC: 81
FEV1/FVC: 81
FEV1/FVC_PERCENT_CHANGE: 0
FEV1/FVC_PERCENT_PREDICTED: 106
FEV1/FVC: 81
FEV1/FVC: 80.91
FEV1_PERCENT_CHANGE: 0
FEV1/FVC_PREDICTED: 76

## 2019-09-24 NOTE — PROCEDURES
Technician: Lala Cárdenas RRT   Good patient effort & cooperation.  The results of this test meet the ATS/ERS standards for acceptability & reproducibility.  Test was performed on the [employer] Body Plethysmograph-Elite DX system.  Predicted values were [facility]-3 for spirometry, [facility] for DLCO, ITS for Lung Volumes.  The DLCO was uncorrected for Hgb.  A bronchodilator of Ventolin HFA -2puffs via spacer administered.  DLCO performed during dilation period.    The FVC is 4.82 L or 97%, FEV1 is 3.9 L or 104%, FEV1/FVC: 81%.  Total lung capacity: 109%.  DLCO: 133%.  No bronchodilator response.    Interpretation:  Normal pulmonary function.

## 2019-09-24 NOTE — PROGRESS NOTES
Chief Complaint   Patient presents with   • Follow-Up     SOB (shortness of breath)   • Results     LABS in Media   • Results     PFT       HPI: This patient is a 59 y.o. Male who returns for pulmonary follow-up.  He is a lifelong non-smoker with no significant past pulmonary history.  In March 2019 he first noted shortness of breath,  cough, chest tightness and wheezing.  He denies specific triggers.  He denies preceding URIs.  Denied associated hemoptysis or edema.  He felt feverish however did not take his temperature.  He was prescribed albuterol inhaler without subjective benefit.  Chest x-ray from July 2019 was normal.  Echocardiogram showed no significant abnormalities. He tried Breo inhaler, without subjective benefit.  At the moment he is asymptomatic, however frustrated that symptoms wax and wane.  Pulmonary function testing today show normal lung function, with FEV1 3.9 L 104% predicted.    Past Medical History:   Diagnosis Date   • Back pain    • Bronchitis    • Ringing in ears    • Sciatica    • Shortness of breath    • Wheezing        Social History     Socioeconomic History   • Marital status:      Spouse name: Not on file   • Number of children: Not on file   • Years of education: Not on file   • Highest education level: Not on file   Occupational History   • Not on file   Social Needs   • Financial resource strain: Not on file   • Food insecurity:     Worry: Not on file     Inability: Not on file   • Transportation needs:     Medical: Not on file     Non-medical: Not on file   Tobacco Use   • Smoking status: Never Smoker   • Smokeless tobacco: Never Used   Substance and Sexual Activity   • Alcohol use: Yes     Frequency: 2-4 times a month     Drinks per session: 1 or 2   • Drug use: No   • Sexual activity: Yes     Partners: Female   Lifestyle   • Physical activity:     Days per week: Not on file     Minutes per session: Not on file   • Stress: Not on file   Relationships   • Social  connections:     Talks on phone: Not on file     Gets together: Not on file     Attends Mor[employer] service: Not on file     Active member of club or organization: Not on file     Attends meetings of clubs or organizations: Not on file     Relationship status: Not on file   • Intimate partner violence:     Fear of current or ex partner: Not on file     Emotionally abused: Not on file     Physically abused: Not on file     Forced sexual activity: Not on file   Other Topics Concern   • Not on file   Social History Narrative   • Not on file       History reviewed. No pertinent family history.    Current Outpatient Medications on File Prior to Visit   Medication Sig Dispense Refill   • VENTOLIN  (90 Base) MCG/ACT Aero Soln inhalation aerosol      • Spacer/Aero-Holding Chambers (AEROCHAMBER PLUS NISHA-VU) Misc      • Fluticasone Furoate-Vilanterol (BREO ELLIPTA) 100-25 MCG/INH AEROSOL POWDER, BREATH ACTIVATED Inhale 1 Puff by mouth every day. Rinse mouth after use. 1 Each 3   • MethylPREDNISolone (MEDROL DOSEPAK) 4 MG Tablet Therapy Pack Use as directed (Patient not taking: Reported on 9/24/2019) 21 Tab 0     No current facility-administered medications on file prior to visit.        Allergies: Other food    ROS:   Constitutional: Denies fevers, chills, night sweats, fatigue or weight loss  Eyes: Denies vision loss, pain, drainage, double vision  Ears, Nose, Throat: Denies earache, difficulty hearing, tinnitus, nasal congestion, hoarseness  Cardiovascular: Denies chest pain, tightness, palpitations, orthopnea or edema,+ congestion  Respiratory: As in HPI  Sleep: Denies daytime sleepiness, snoring, apneas, insomnia, morning headaches  GI: Denies heartburn, dysphagia, nausea, abdominal pain, diarrhea or constipation  : Denies frequent urination, hematuria, discharge or painful urination  Musculoskeletal: Denies back pain, painful joints, sore muscles  Neurological: Denies weakness or headaches  Skin: No rashes    BP  "142/88 (BP Location: Left arm, Patient Position: Sitting, BP Cuff Size: Adult)   Pulse 70   Resp 16   Ht 1.803 m (5' 11\")   Wt 83.5 kg (184 lb)   SpO2 97%     Physical Exam:  Appearance: Well-nourished, well-developed, in no acute distress  HEENT: Normocephalic, atraumatic, white sclera, PERRLA, oropharynx clear  Neck: No adenopathy or masses  Respiratory: no intercostal retractions or accessory muscle use  Lungs auscultation: Clear to auscultation bilaterally  Cardiovascular: Regular rate rhythm. No murmurs, rubs or gallops.  No LE edema  Abdomen: soft, nondistended  Gait: Normal  Digits: No clubbing, cyanosis  Motor: No focal deficits  Orientation: Oriented to time, person and place    Diagnosis:  1. Chest congestion  CT-CHEST (THORAX) W/O       Plan:  The patient has waxing and waning chest congestion, currently asymptomatic.  He has normal pulmonary function.  Chest x-ray is normal.  He failed empiric inhaled bronchodilators.  We discussed chest CAT scan to exclude pulmonary parenchymal or mediastinal abnormalities.  If unremarkable, then GI work-up may need to be considered.  Return in about 4 weeks (around 10/22/2019).      "

## 2019-11-19 DIAGNOSIS — Z00.6 RESEARCH STUDY PATIENT: ICD-10-CM

## 2019-11-19 NOTE — PROGRESS NOTES
[facility] [place] Cardiac Calcium CT Trial:  Mr. Rayo contacted via telephone by Berna Carrillo, study team member for enrollment in the above stated trial. Informed consent form reviewed, questions answered.  Consent signed via [employer] by Mr. Rayo on 11/18/2019 and witnessed by Berna Carrillo on 11/18/2019.  Order for CT scan placed in [facility].

## 2019-12-10 ENCOUNTER — HOSPITAL ENCOUNTER (OUTPATIENT)
Dept: RADIOLOGY | Facility: MEDICAL CENTER | Age: 59
End: 2019-12-10
Attending: INTERNAL MEDICINE

## 2019-12-10 DIAGNOSIS — Z00.6 RESEARCH STUDY PATIENT: ICD-10-CM

## 2019-12-10 PROCEDURE — 4410556 CT-CARDIAC SCORING

## 2019-12-17 ENCOUNTER — TELEPHONE (OUTPATIENT)
Dept: CARDIOLOGY | Facility: MEDICAL CENTER | Age: 59
End: 2019-12-17

## 2019-12-17 NOTE — TELEPHONE ENCOUNTER
[facility] [place] Cardiac Calcium Scoring CT study:  Dr. Beckwith contacted Mr. Rayo 12-16-19 to discuss results of CT Cardiac Calcium study.    Findings:   Calcium score:  224.6  Please see complete report in Imaging.      IMPRESSION:  Moderate calcium in two main coronary arteries.  Patient is 80%for age, gender and ethnicity.  Consider medical therapy and expert consultation.  Incidental finding of a thoracic aneurysm at 4.4 cm.  Recommend repeat CTA in 6 months.    Patient will make an appointment with Dr. Beckwith to discuss and follow the aneurysm.      Patient has study team contact information for questions.

## 2019-12-18 ENCOUNTER — TELEPHONE (OUTPATIENT)
Dept: CARDIOLOGY | Facility: MEDICAL CENTER | Age: 59
End: 2019-12-18

## 2019-12-18 NOTE — TELEPHONE ENCOUNTER
Discussion with vascular clinic and cardiologist. Pt called to schedule for NP appt tomorrow afternoon. Pt is agreeable to a 2 pm appt tomorrow with Dr. Beckwith. Pt states understanding of office location.

## 2019-12-19 ENCOUNTER — OFFICE VISIT (OUTPATIENT)
Dept: CARDIOLOGY | Facility: MEDICAL CENTER | Age: 59
End: 2019-12-19
Payer: COMMERCIAL

## 2019-12-19 VITALS
SYSTOLIC BLOOD PRESSURE: 142 MMHG | BODY MASS INDEX: 25.6 KG/M2 | OXYGEN SATURATION: 97 % | WEIGHT: 189 LBS | HEIGHT: 72 IN | HEART RATE: 84 BPM | DIASTOLIC BLOOD PRESSURE: 92 MMHG

## 2019-12-19 DIAGNOSIS — Z00.6 RESEARCH STUDY PATIENT: ICD-10-CM

## 2019-12-19 DIAGNOSIS — R93.1 AGATSTON CAC SCORE 200-399: ICD-10-CM

## 2019-12-19 DIAGNOSIS — R93.1 AGATSTON CORONARY ARTERY CALCIUM SCORE BETWEEN 200 AND 399: ICD-10-CM

## 2019-12-19 DIAGNOSIS — I71.20 THORACIC AORTIC ANEURYSM WITHOUT RUPTURE (HCC): ICD-10-CM

## 2019-12-19 PROCEDURE — 93000 ELECTROCARDIOGRAM COMPLETE: CPT | Performed by: INTERNAL MEDICINE

## 2019-12-19 PROCEDURE — 99204 OFFICE O/P NEW MOD 45 MIN: CPT | Performed by: INTERNAL MEDICINE

## 2019-12-19 RX ORDER — INFLUENZA A VIRUS A/BRISBANE/02/2018 IVR-190 (H1N1) ANTIGEN (PROPIOLACTONE INACTIVATED), INFLUENZA A VIRUS A/KANSAS/14/2017 X-327 (H3N2) ANTIGEN (PROPIOLACTONE INACTIVATED), INFLUENZA B VIRUS B/MARYLAND/15/2016 ANTIGEN (PROPIOLACTONE INACTIVATED), INFLUENZA B VIRUS B/PHUKET/3073/2013 BVR-1B ANTIGEN (PROPIOLACTONE INACTIVATED) 15; 15; 15; 15 UG/.5ML; UG/.5ML; UG/.5ML; UG/.5ML
INJECTION, SUSPENSION INTRAMUSCULAR
Refills: 0 | COMMUNITY
Start: 2019-11-01 | End: 2019-12-19

## 2019-12-19 RX ORDER — ROSUVASTATIN CALCIUM 20 MG/1
20 TABLET, COATED ORAL EVERY EVENING
Qty: 30 TAB | Refills: 11 | Status: SHIPPED | OUTPATIENT
Start: 2019-12-19 | End: 2020-10-09

## 2019-12-19 ASSESSMENT — ENCOUNTER SYMPTOMS
FEVER: 0
ORTHOPNEA: 0
PND: 0
WEAKNESS: 0
SPUTUM PRODUCTION: 0
CONSTITUTIONAL NEGATIVE: 1
EYES NEGATIVE: 1
LOSS OF CONSCIOUSNESS: 0
SORE THROAT: 0
STRIDOR: 0
NEUROLOGICAL NEGATIVE: 1
SHORTNESS OF BREATH: 0
CARDIOVASCULAR NEGATIVE: 1
RESPIRATORY NEGATIVE: 1
MUSCULOSKELETAL NEGATIVE: 1
CLAUDICATION: 0
DIZZINESS: 0
WHEEZING: 0
CHILLS: 0
PALPITATIONS: 0
HEMOPTYSIS: 0
COUGH: 0
GASTROINTESTINAL NEGATIVE: 1
BRUISES/BLEEDS EASILY: 0

## 2019-12-19 NOTE — PROGRESS NOTES
Chief Complaint   Patient presents with   • Abdominal Aortic Aneurysm     NP per [facility] Dx: Thoracic aortic aneurysm       Subjective:   Ilya Rayo is a 59 y.o. male who presents today as a new consultation for dilated aorta as well as a high calcium score.  He is an otherwise healthy 59-year-old male with a past medical history of hyperlipidemia.  His last LDL was 136.  He works out 5 days/week.  He is a normal body weight.  As part of the [facility] project he underwent a calcium score.  It was positive at 224.6 placing him in the 80th percentile for age gender and ethnicity.  Additionally as part of his CT scan he was found to have an ascending aortic aneurysm with a diameter of 4.4 cm.  His BSA is 2.1.  He is slightly above the normal gram.  He has no family history of premature cardiac death or heart disease.  He is otherwise healthy with no chest pain.    Past Medical History:   Diagnosis Date   • Back pain    • Bronchitis    • Ringing in ears    • Sciatica    • Shortness of breath    • Wheezing      Past Surgical History:   Procedure Laterality Date   • HYSTERECTOMY LAPAROSCOPY     • TONSILLECTOMY       No family history on file.  Social History     Socioeconomic History   • Marital status:      Spouse name: Not on file   • Number of children: Not on file   • Years of education: Not on file   • Highest education level: Not on file   Occupational History   • Not on file   Social Needs   • Financial resource strain: Not on file   • Food insecurity:     Worry: Not on file     Inability: Not on file   • Transportation needs:     Medical: Not on file     Non-medical: Not on file   Tobacco Use   • Smoking status: Never Smoker   • Smokeless tobacco: Never Used   Substance and Sexual Activity   • Alcohol use: Yes     Frequency: 2-4 times a month     Drinks per session: 1 or 2   • Drug use: No   • Sexual activity: Yes     Partners: Female   Lifestyle   • Physical activity:     Days per week: Not  on file     Minutes per session: Not on file   • Stress: Not on file   Relationships   • Social connections:     Talks on phone: Not on file     Gets together: Not on file     Attends Orthodox service: Not on file     Active member of club or organization: Not on file     Attends meetings of clubs or organizations: Not on file     Relationship status: Not on file   • Intimate partner violence:     Fear of current or ex partner: Not on file     Emotionally abused: Not on file     Physically abused: Not on file     Forced sexual activity: Not on file   Other Topics Concern   • Not on file   Social History Narrative   • Not on file     Allergies   Allergen Reactions   • Other Food      [place] , trout      Outpatient Encounter Medications as of 12/19/2019   Medication Sig Dispense Refill   • rosuvastatin (CRESTOR) 20 MG Tab Take 1 Tab by mouth every evening. 30 Tab 11   • VENTOLIN  (90 Base) MCG/ACT Aero Soln inhalation aerosol      • [DISCONTINUED] AFLURIA QUADRIVALENT 0.5 ML Suspension Prefilled Syringe injection ADM 0.5ML IM UTD  0   • [DISCONTINUED] Spacer/Aero-Holding Chambers (AEROCHAMBER PLUS NISHA-VU) Misc      • [DISCONTINUED] Fluticasone Furoate-Vilanterol (BREO ELLIPTA) 100-25 MCG/INH AEROSOL POWDER, BREATH ACTIVATED Inhale 1 Puff by mouth every day. Rinse mouth after use. (Patient not taking: Reported on 12/19/2019) 1 Each 3   • [DISCONTINUED] MethylPREDNISolone (MEDROL DOSEPAK) 4 MG Tablet Therapy Pack Use as directed (Patient not taking: Reported on 9/24/2019) 21 Tab 0     No facility-administered encounter medications on file as of 12/19/2019.      Review of Systems   Constitutional: Negative.  Negative for chills, fever and malaise/fatigue.   HENT: Negative.  Negative for sore throat.    Eyes: Negative.    Respiratory: Negative.  Negative for cough, hemoptysis, sputum production, shortness of breath, wheezing and stridor.    Cardiovascular: Negative.  Negative for chest pain, palpitations,  orthopnea, claudication, leg swelling and PND.   Gastrointestinal: Negative.    Genitourinary: Negative.    Musculoskeletal: Negative.    Skin: Negative.    Neurological: Negative.  Negative for dizziness, loss of consciousness and weakness.   Endo/Heme/Allergies: Negative.  Does not bruise/bleed easily.   All other systems reviewed and are negative.       Objective:   /92 (BP Location: Left arm, Patient Position: Sitting, BP Cuff Size: Adult)   Pulse 84   Ht 1.829 m (6')   Wt 85.7 kg (189 lb)   SpO2 97%   BMI 25.63 kg/m²     Physical Exam   Constitutional: He appears well-developed and well-nourished. No distress.   HENT:   Head: Normocephalic and atraumatic.   Right Ear: External ear normal.   Left Ear: External ear normal.   Nose: Nose normal.   Mouth/Throat: No oropharyngeal exudate.   Eyes: Pupils are equal, round, and reactive to light. Conjunctivae and EOM are normal. Right eye exhibits no discharge. Left eye exhibits no discharge. No scleral icterus.   Neck: Neck supple. No JVD present.   Cardiovascular: Normal rate, regular rhythm and intact distal pulses. Exam reveals no gallop and no friction rub.   No murmur heard.  Pulmonary/Chest: Effort normal. No stridor. No respiratory distress. He has no wheezes. He has no rales. He exhibits no tenderness.   Abdominal: Soft. He exhibits no distension. There is no guarding.   Musculoskeletal: Normal range of motion.         General: No tenderness, deformity or edema.   Neurological: He is alert. He has normal reflexes. He displays normal reflexes. No cranial nerve deficit. He exhibits normal muscle tone. Coordination normal.   Skin: Skin is warm and dry. No rash noted. He is not diaphoretic. No erythema. No pallor.   Psychiatric: He has a normal mood and affect. His behavior is normal. Judgment and thought content normal.   Nursing note and vitals reviewed.      Assessment:     1. Thoracic aortic aneurysm without rupture (HCC)  EKG   2. Agatston coronary  artery calcium score between 200 and 399  EKG    rosuvastatin (CRESTOR) 20 MG Tab   3. Research study patient  rosuvastatin (CRESTOR) 20 MG Tab   4. [facility] Billing     5. Agatston CAC score 200-399  rosuvastatin (CRESTOR) 20 MG Tab       Medical Decision Making:  Today's Assessment / Status / Plan:     59-year-old male with a premature calcium score to 24.6 80th percentile for age gender and ethnicity.  We will start on Crestor 20 mg/day.  We will follow-up with a CT scan in 6 months.  We will see him in clinic in 6 months

## 2019-12-24 LAB — EKG IMPRESSION: NORMAL

## 2020-06-10 ENCOUNTER — OFFICE VISIT (OUTPATIENT)
Dept: CARDIOLOGY | Facility: MEDICAL CENTER | Age: 60
End: 2020-06-10
Payer: COMMERCIAL

## 2020-06-10 VITALS
HEIGHT: 72 IN | BODY MASS INDEX: 23.43 KG/M2 | WEIGHT: 173 LBS | HEART RATE: 76 BPM | DIASTOLIC BLOOD PRESSURE: 78 MMHG | SYSTOLIC BLOOD PRESSURE: 118 MMHG | OXYGEN SATURATION: 95 % | RESPIRATION RATE: 16 BRPM

## 2020-06-10 DIAGNOSIS — R93.1 AGATSTON CAC SCORE 200-399: ICD-10-CM

## 2020-06-10 DIAGNOSIS — I71.20 THORACIC AORTIC ANEURYSM WITHOUT RUPTURE (HCC): ICD-10-CM

## 2020-06-10 DIAGNOSIS — R93.1 AGATSTON CORONARY ARTERY CALCIUM SCORE BETWEEN 200 AND 399: ICD-10-CM

## 2020-06-10 DIAGNOSIS — Z00.6 RESEARCH STUDY PATIENT: ICD-10-CM

## 2020-06-10 PROCEDURE — 99214 OFFICE O/P EST MOD 30 MIN: CPT | Performed by: INTERNAL MEDICINE

## 2020-06-11 ASSESSMENT — ENCOUNTER SYMPTOMS
ORTHOPNEA: 0
SHORTNESS OF BREATH: 0
CARDIOVASCULAR NEGATIVE: 1
SPUTUM PRODUCTION: 0
SORE THROAT: 0
MUSCULOSKELETAL NEGATIVE: 1
WHEEZING: 0
PALPITATIONS: 0
EYES NEGATIVE: 1
WEAKNESS: 0
STRIDOR: 0
FEVER: 0
COUGH: 0
GASTROINTESTINAL NEGATIVE: 1
PND: 0
HEMOPTYSIS: 0
LOSS OF CONSCIOUSNESS: 0
BRUISES/BLEEDS EASILY: 0
CONSTITUTIONAL NEGATIVE: 1
CHILLS: 0
CLAUDICATION: 0
RESPIRATORY NEGATIVE: 1
NEUROLOGICAL NEGATIVE: 1
DIZZINESS: 0

## 2020-06-11 NOTE — PROGRESS NOTES
Chief Complaint   Patient presents with   • Follow-Up     Thoracic aortic aneurysm without rupture (HCC)        Subjective:   Ilya Rayo is a 59 y.o. male who presents today as a follow-up for his thoracic aortic aneurysm and elevated calcium score.  Since he was last seen he continues to do well.  He has no chest pain palpitations or PND.  He continues to workout.  Calcium score is at the 80th percentile.  He does have a mildly dilated aorta however he is tall.  We have not followed up for 1 year.    Past Medical History:   Diagnosis Date   • Back pain    • Bronchitis    • Ringing in ears    • Sciatica    • Shortness of breath    • Wheezing      Past Surgical History:   Procedure Laterality Date   • HYSTERECTOMY LAPAROSCOPY     • TONSILLECTOMY       History reviewed. No pertinent family history.  Social History     Socioeconomic History   • Marital status:      Spouse name: Not on file   • Number of children: Not on file   • Years of education: Not on file   • Highest education level: Not on file   Occupational History   • Not on file   Social Needs   • Financial resource strain: Not on file   • Food insecurity     Worry: Not on file     Inability: Not on file   • Transportation needs     Medical: Not on file     Non-medical: Not on file   Tobacco Use   • Smoking status: Never Smoker   • Smokeless tobacco: Never Used   Substance and Sexual Activity   • Alcohol use: Yes     Frequency: 2-4 times a month     Drinks per session: 1 or 2   • Drug use: No   • Sexual activity: Yes     Partners: Female   Lifestyle   • Physical activity     Days per week: Not on file     Minutes per session: Not on file   • Stress: Not on file   Relationships   • Social connections     Talks on phone: Not on file     Gets together: Not on file     Attends Anglican service: Not on file     Active member of club or organization: Not on file     Attends meetings of clubs or organizations: Not on file     Relationship status: Not  on file   • Intimate partner violence     Fear of current or ex partner: Not on file     Emotionally abused: Not on file     Physically abused: Not on file     Forced sexual activity: Not on file   Other Topics Concern   • Not on file   Social History Narrative   • Not on file     Allergies   Allergen Reactions   • Other Food      [place] , trout      Outpatient Encounter Medications as of 6/10/2020   Medication Sig Dispense Refill   • rosuvastatin (CRESTOR) 20 MG Tab Take 1 Tab by mouth every evening. 30 Tab 11   • VENTOLIN  (90 Base) MCG/ACT Aero Soln inhalation aerosol        No facility-administered encounter medications on file as of 6/10/2020.      Review of Systems   Constitutional: Negative.  Negative for chills, fever and malaise/fatigue.   HENT: Negative.  Negative for sore throat.    Eyes: Negative.    Respiratory: Negative.  Negative for cough, hemoptysis, sputum production, shortness of breath, wheezing and stridor.    Cardiovascular: Negative.  Negative for chest pain, palpitations, orthopnea, claudication, leg swelling and PND.   Gastrointestinal: Negative.    Genitourinary: Negative.    Musculoskeletal: Negative.    Skin: Negative.    Neurological: Negative.  Negative for dizziness, loss of consciousness and weakness.   Endo/Heme/Allergies: Negative.  Does not bruise/bleed easily.   All other systems reviewed and are negative.       Objective:   /78 (BP Location: Left arm, Patient Position: Sitting, BP Cuff Size: Adult)   Pulse 76   Resp 16   Ht 1.829 m (6')   Wt 78.5 kg (173 lb)   SpO2 95%   BMI 23.46 kg/m²     Physical Exam   Constitutional: He appears well-developed and well-nourished. No distress.   HENT:   Head: Normocephalic and atraumatic.   Right Ear: External ear normal.   Left Ear: External ear normal.   Nose: Nose normal.   Mouth/Throat: No oropharyngeal exudate.   Eyes: Pupils are equal, round, and reactive to light. Conjunctivae and EOM are normal. Right eye exhibits no  discharge. Left eye exhibits no discharge. No scleral icterus.   Neck: Neck supple. No JVD present.   Cardiovascular: Normal rate, regular rhythm and intact distal pulses. Exam reveals no gallop and no friction rub.   No murmur heard.  Pulmonary/Chest: Effort normal. No stridor. No respiratory distress. He has no wheezes. He has no rales. He exhibits no tenderness.   Abdominal: Soft. He exhibits no distension. There is no guarding.   Musculoskeletal: Normal range of motion.         General: No tenderness, deformity or edema.   Neurological: He is alert. He has normal reflexes. He displays normal reflexes. No cranial nerve deficit. He exhibits normal muscle tone. Coordination normal.   Skin: Skin is warm and dry. No rash noted. He is not diaphoretic. No erythema. No pallor.   Psychiatric: He has a normal mood and affect. His behavior is normal. Judgment and thought content normal.   Nursing note and vitals reviewed.      Assessment:     1. Agatston CAC score 200-399  CT-CHEST (THORAX) WITH    LIPID PANEL    LIPOPROTEIN A    Basic Metabolic Panel   2. Research study patient  CT-CHEST (THORAX) WITH    Basic Metabolic Panel   3. Thoracic aortic aneurysm without rupture (HCC)  CT-CHEST (THORAX) WITH    LIPID PANEL    LIPOPROTEIN A    Basic Metabolic Panel   4. Agatston coronary artery calcium score between 200 and 399  Basic Metabolic Panel       Medical Decision Making:  Today's Assessment / Status / Plan:     59-year-old male with a dilated aorta and an elevated calcium score.  We will get a CT chest to follow-up on his dilated aorta.  I will order basic metabolic panel.  I would also like to get a lipid and a lipoprotein here for his high cholesterol.  We will see him back in 1 year.

## 2020-06-12 ENCOUNTER — TELEPHONE (OUTPATIENT)
Dept: CARDIOLOGY | Facility: MEDICAL CENTER | Age: 60
End: 2020-06-12

## 2020-06-13 ENCOUNTER — HOSPITAL ENCOUNTER (OUTPATIENT)
Dept: LAB | Facility: MEDICAL CENTER | Age: 60
End: 2020-06-13
Attending: INTERNAL MEDICINE
Payer: COMMERCIAL

## 2020-06-13 DIAGNOSIS — R93.1 AGATSTON CORONARY ARTERY CALCIUM SCORE BETWEEN 200 AND 399: ICD-10-CM

## 2020-06-13 DIAGNOSIS — Z00.6 RESEARCH STUDY PATIENT: ICD-10-CM

## 2020-06-13 DIAGNOSIS — I71.20 THORACIC AORTIC ANEURYSM WITHOUT RUPTURE (HCC): ICD-10-CM

## 2020-06-13 DIAGNOSIS — R93.1 AGATSTON CAC SCORE 200-399: ICD-10-CM

## 2020-06-13 LAB
ANION GAP SERPL CALC-SCNC: 10 MMOL/L (ref 7–16)
BUN SERPL-MCNC: 18 MG/DL (ref 8–22)
CALCIUM SERPL-MCNC: 9.1 MG/DL (ref 8.5–10.5)
CHLORIDE SERPL-SCNC: 106 MMOL/L (ref 96–112)
CHOLEST SERPL-MCNC: 123 MG/DL (ref 100–199)
CO2 SERPL-SCNC: 25 MMOL/L (ref 20–33)
CREAT SERPL-MCNC: 1.03 MG/DL (ref 0.5–1.4)
FASTING STATUS PATIENT QL REPORTED: NORMAL
GLUCOSE SERPL-MCNC: 96 MG/DL (ref 65–99)
HDLC SERPL-MCNC: 53 MG/DL
LDLC SERPL CALC-MCNC: 58 MG/DL
POTASSIUM SERPL-SCNC: 4.7 MMOL/L (ref 3.6–5.5)
SODIUM SERPL-SCNC: 141 MMOL/L (ref 135–145)
TRIGL SERPL-MCNC: 60 MG/DL (ref 0–149)

## 2020-06-13 PROCEDURE — 83695 ASSAY OF LIPOPROTEIN(A): CPT

## 2020-06-13 PROCEDURE — 36415 COLL VENOUS BLD VENIPUNCTURE: CPT

## 2020-06-13 PROCEDURE — 80048 BASIC METABOLIC PNL TOTAL CA: CPT

## 2020-06-13 PROCEDURE — 80061 LIPID PANEL: CPT

## 2020-06-15 LAB — LPA SERPL-MCNC: <6 MG/DL

## 2020-06-18 ENCOUNTER — HOSPITAL ENCOUNTER (OUTPATIENT)
Dept: RADIOLOGY | Facility: MEDICAL CENTER | Age: 60
End: 2020-06-18
Attending: INTERNAL MEDICINE
Payer: COMMERCIAL

## 2020-06-18 DIAGNOSIS — I71.20 THORACIC AORTIC ANEURYSM WITHOUT RUPTURE (HCC): ICD-10-CM

## 2020-06-18 DIAGNOSIS — R93.1 AGATSTON CAC SCORE 200-399: ICD-10-CM

## 2020-06-18 DIAGNOSIS — Z00.6 RESEARCH STUDY PATIENT: ICD-10-CM

## 2020-06-18 PROCEDURE — 71260 CT THORAX DX C+: CPT

## 2020-06-18 PROCEDURE — 700117 HCHG RX CONTRAST REV CODE 255: Performed by: INTERNAL MEDICINE

## 2020-06-18 RX ADMIN — IOHEXOL 75 ML: 350 INJECTION, SOLUTION INTRAVENOUS at 14:00

## 2020-06-29 ENCOUNTER — PATIENT MESSAGE (OUTPATIENT)
Dept: CARDIOLOGY | Facility: MEDICAL CENTER | Age: 60
End: 2020-06-29

## 2020-06-29 NOTE — PATIENT COMMUNICATION
Result Notes for CT-CHEST (THORAX) WITH     Notes recorded by Sagar Beckwith M.D. on 6/29/2020 at 10:47 AM PDT   Please let patient know his thoracic aorta is unchanged   ------     Notes recorded by Mee Arreola R.N. on 6/23/2020 at 2:16 PM PDT   Pt seen 6/10, on a one year recall      My Chart msg sent

## 2020-10-08 PROBLEM — Z00.6 RESEARCH STUDY PATIENT: Status: RESOLVED | Noted: 2019-11-19 | Resolved: 2020-10-08

## 2020-10-09 DIAGNOSIS — Z00.6 RESEARCH STUDY PATIENT: ICD-10-CM

## 2020-10-09 DIAGNOSIS — R93.1 AGATSTON CORONARY ARTERY CALCIUM SCORE BETWEEN 200 AND 399: ICD-10-CM

## 2020-10-09 DIAGNOSIS — R93.1 AGATSTON CAC SCORE 200-399: ICD-10-CM

## 2020-10-14 RX ORDER — ROSUVASTATIN CALCIUM 20 MG/1
TABLET, COATED ORAL
Qty: 90 TAB | Refills: 2 | Status: SHIPPED | OUTPATIENT
Start: 2020-10-14 | End: 2021-06-15 | Stop reason: SDUPTHER

## 2021-05-10 ENCOUNTER — HOSPITAL ENCOUNTER (OUTPATIENT)
Dept: LAB | Facility: MEDICAL CENTER | Age: 61
End: 2021-05-10
Attending: INTERNAL MEDICINE
Payer: COMMERCIAL

## 2021-05-10 PROCEDURE — 84439 ASSAY OF FREE THYROXINE: CPT

## 2021-05-10 PROCEDURE — 84443 ASSAY THYROID STIM HORMONE: CPT

## 2021-05-10 PROCEDURE — 86376 MICROSOMAL ANTIBODY EACH: CPT

## 2021-05-10 PROCEDURE — 36415 COLL VENOUS BLD VENIPUNCTURE: CPT

## 2021-05-11 LAB
T4 FREE SERPL-MCNC: 1.46 NG/DL (ref 0.93–1.7)
TSH SERPL DL<=0.005 MIU/L-ACNC: 2.02 UIU/ML (ref 0.38–5.33)

## 2021-05-12 LAB — THYROPEROXIDASE AB SERPL-ACNC: 0.3 IU/ML (ref 0–9)

## 2021-06-15 ENCOUNTER — OFFICE VISIT (OUTPATIENT)
Dept: CARDIOLOGY | Facility: MEDICAL CENTER | Age: 61
End: 2021-06-15
Payer: COMMERCIAL

## 2021-06-15 VITALS
HEIGHT: 72 IN | HEART RATE: 64 BPM | BODY MASS INDEX: 22.54 KG/M2 | DIASTOLIC BLOOD PRESSURE: 80 MMHG | WEIGHT: 166.4 LBS | RESPIRATION RATE: 14 BRPM | OXYGEN SATURATION: 96 % | SYSTOLIC BLOOD PRESSURE: 136 MMHG

## 2021-06-15 DIAGNOSIS — R93.1 AGATSTON CAC SCORE 200-399: ICD-10-CM

## 2021-06-15 DIAGNOSIS — Z00.6 RESEARCH STUDY PATIENT: ICD-10-CM

## 2021-06-15 DIAGNOSIS — I71.20 THORACIC AORTIC ANEURYSM WITHOUT RUPTURE (HCC): ICD-10-CM

## 2021-06-15 DIAGNOSIS — R93.1 AGATSTON CORONARY ARTERY CALCIUM SCORE BETWEEN 200 AND 399: ICD-10-CM

## 2021-06-15 PROCEDURE — 99214 OFFICE O/P EST MOD 30 MIN: CPT | Performed by: INTERNAL MEDICINE

## 2021-06-15 RX ORDER — ROSUVASTATIN CALCIUM 20 MG/1
20 TABLET, COATED ORAL EVERY EVENING
Qty: 90 TABLET | Refills: 3 | Status: SHIPPED | OUTPATIENT
Start: 2021-06-15 | End: 2022-06-01 | Stop reason: SDUPTHER

## 2021-06-15 RX ORDER — ROSUVASTATIN CALCIUM 20 MG/1
TABLET, COATED ORAL
COMMUNITY
End: 2021-06-15

## 2021-06-15 ASSESSMENT — ENCOUNTER SYMPTOMS
MUSCULOSKELETAL NEGATIVE: 1
WEAKNESS: 0
LOSS OF CONSCIOUSNESS: 0
EYES NEGATIVE: 1
PALPITATIONS: 0
CHILLS: 0
COUGH: 0
SPUTUM PRODUCTION: 0
STRIDOR: 0
RESPIRATORY NEGATIVE: 1
CONSTITUTIONAL NEGATIVE: 1
WHEEZING: 0
DIZZINESS: 0
FEVER: 0
HEMOPTYSIS: 0
BRUISES/BLEEDS EASILY: 0
NEUROLOGICAL NEGATIVE: 1
CARDIOVASCULAR NEGATIVE: 1
ORTHOPNEA: 0
CLAUDICATION: 0
SHORTNESS OF BREATH: 0
SORE THROAT: 0
GASTROINTESTINAL NEGATIVE: 1
PND: 0

## 2021-06-15 NOTE — PROGRESS NOTES
Chief Complaint   Patient presents with   • Shortness of Breath       Subjective:   Ilya Rayo is a 59 y.o. male who presents today as a follow-up for his thoracic aortic aneurysm and elevated calcium score.    Since he was last seen he has been active hiking.  He plays golf.  His last LDL was in the 50s.  He continues his rosuvastatin.  His last CT scan showed his thoracic aorta aneurysm at 4.2 x 4.1.    Past Medical History:   Diagnosis Date   • Back pain    • Bronchitis    • Ringing in ears    • Sciatica    • Shortness of breath    • Wheezing      Past Surgical History:   Procedure Laterality Date   • HYSTERECTOMY LAPAROSCOPY     • TONSILLECTOMY       History reviewed. No pertinent family history.  Social History     Socioeconomic History   • Marital status:      Spouse name: Not on file   • Number of children: Not on file   • Years of education: Not on file   • Highest education level: Not on file   Occupational History   • Not on file   Tobacco Use   • Smoking status: Never Smoker   • Smokeless tobacco: Never Used   Vaping Use   • Vaping Use: Never used   Substance and Sexual Activity   • Alcohol use: Yes   • Drug use: No   • Sexual activity: Yes     Partners: Female   Other Topics Concern   • Not on file   Social History Narrative   • Not on file     Social Determinants of Health     Financial Resource Strain:    • Difficulty of Paying Living Expenses:    Food Insecurity:    • Worried About Running Out of Food in the Last Year:    • Ran Out of Food in the Last Year:    Transportation Needs:    • Lack of Transportation (Medical):    • Lack of Transportation (Non-Medical):    Physical Activity:    • Days of Exercise per Week:    • Minutes of Exercise per Session:    Stress:    • Feeling of Stress :    Social Connections:    • Frequency of Communication with Friends and Family:    • Frequency of Social Gatherings with Friends and Family:    • Attends Taoism Services:    • Active Member of Clubs  or Organizations:    • Attends Club or Organization Meetings:    • Marital Status:    Intimate Partner Violence:    • Fear of Current or Ex-Partner:    • Emotionally Abused:    • Physically Abused:    • Sexually Abused:      Allergies   Allergen Reactions   • Other Food      [place] , trout      Outpatient Encounter Medications as of 6/15/2021   Medication Sig Dispense Refill   • rosuvastatin (CRESTOR) 20 MG Tab Take 1 tablet by mouth every evening. 90 tablet 3   • [DISCONTINUED] rosuvastatin (CRESTOR) 20 MG Tab 1 tablet (Patient not taking: Reported on 6/15/2021)     • [DISCONTINUED] rosuvastatin (CRESTOR) 20 MG Tab TAKE ONE TABLET BY MOUTH EVERY EVENING 90 Tab 2   • [DISCONTINUED] VENTOLIN  (90 Base) MCG/ACT Aero Soln inhalation aerosol        No facility-administered encounter medications on file as of 6/15/2021.     Review of Systems   Constitutional: Negative.  Negative for chills, fever and malaise/fatigue.   HENT: Negative.  Negative for sore throat.    Eyes: Negative.    Respiratory: Negative.  Negative for cough, hemoptysis, sputum production, shortness of breath, wheezing and stridor.    Cardiovascular: Negative.  Negative for chest pain, palpitations, orthopnea, claudication, leg swelling and PND.   Gastrointestinal: Negative.    Genitourinary: Negative.    Musculoskeletal: Negative.    Skin: Negative.    Neurological: Negative.  Negative for dizziness, loss of consciousness and weakness.   Endo/Heme/Allergies: Negative.  Does not bruise/bleed easily.   All other systems reviewed and are negative.       Objective:   /80 (BP Location: Right arm, Patient Position: Sitting, BP Cuff Size: Adult)   Pulse 64   Resp 14   Ht 1.829 m (6')   Wt 75.5 kg (166 lb 6.4 oz)   SpO2 96%   BMI 22.57 kg/m²     Physical Exam   Constitutional: He appears well-developed. No distress.   HENT:   Head: Normocephalic and atraumatic.   Right Ear: External ear normal.   Left Ear: External ear normal.   Nose: Nose  normal.   Mouth/Throat: No oropharyngeal exudate.   Eyes: Pupils are equal, round, and reactive to light. Conjunctivae are normal. Right eye exhibits no discharge. Left eye exhibits no discharge. No scleral icterus.   Neck: No JVD present.   Cardiovascular: Normal rate and regular rhythm. Exam reveals no gallop and no friction rub.   No murmur heard.  Pulmonary/Chest: Effort normal. No stridor. No respiratory distress. He has no wheezes. He has no rales. He exhibits no tenderness.   Abdominal: Soft. He exhibits no distension. There is no guarding.   Musculoskeletal:         General: No tenderness or deformity. Normal range of motion.      Cervical back: Neck supple.   Neurological: He is alert. He has normal reflexes. He displays normal reflexes. No cranial nerve deficit. He exhibits normal muscle tone. Coordination normal.   Skin: Skin is warm and dry. No rash noted. He is not diaphoretic. No erythema. No pallor.   Psychiatric: His behavior is normal. Judgment and thought content normal.   Nursing note and vitals reviewed.      Assessment:     1. Agatston CAC score 200-399  rosuvastatin (CRESTOR) 20 MG Tab   2. Thoracic aortic aneurysm without rupture (HCC)     3. Agatston coronary artery calcium score between 200 and 399  rosuvastatin (CRESTOR) 20 MG Tab   4. Research study patient  rosuvastatin (CRESTOR) 20 MG Tab       Medical Decision Making:  Today's Assessment / Status / Plan:     59-year-old male with a dilated aorta and an elevated calcium score.  We will get a CT chest to follow-up on his dilated aorta.  I personally reviewed his labs as well as his CT scan for his aorta.  I will continue him on the rosuvastatin.  We will keep him on this for his high cholesterol.  We will recheck labs in 1 year.  We will reassess his aorta likely in 2 to 5 years.

## 2021-07-30 ENCOUNTER — OFFICE VISIT (OUTPATIENT)
Dept: URGENT CARE | Facility: CLINIC | Age: 61
End: 2021-07-30
Payer: COMMERCIAL

## 2021-07-30 VITALS
BODY MASS INDEX: 22.35 KG/M2 | DIASTOLIC BLOOD PRESSURE: 70 MMHG | WEIGHT: 165 LBS | OXYGEN SATURATION: 97 % | HEIGHT: 72 IN | SYSTOLIC BLOOD PRESSURE: 110 MMHG | TEMPERATURE: 98.5 F | HEART RATE: 66 BPM | RESPIRATION RATE: 14 BRPM

## 2021-07-30 DIAGNOSIS — R21 FACIAL RASH: ICD-10-CM

## 2021-07-30 DIAGNOSIS — L71.9 ROSACEA: ICD-10-CM

## 2021-07-30 DIAGNOSIS — Z87.2 HISTORY OF ROSACEA: ICD-10-CM

## 2021-07-30 PROBLEM — E04.2 NON-TOXIC MULTINODULAR GOITER: Status: ACTIVE | Noted: 2021-07-30

## 2021-07-30 PROCEDURE — 99204 OFFICE O/P NEW MOD 45 MIN: CPT | Performed by: NURSE PRACTITIONER

## 2021-07-30 RX ORDER — MINOCYCLINE HYDROCHLORIDE 100 MG/1
100 TABLET ORAL 2 TIMES DAILY
Qty: 20 TABLET | Refills: 2 | Status: SHIPPED | OUTPATIENT
Start: 2021-07-30 | End: 2021-08-09

## 2021-07-30 ASSESSMENT — ENCOUNTER SYMPTOMS
CONSTITUTIONAL NEGATIVE: 1
FEVER: 0

## 2021-07-30 ASSESSMENT — VISUAL ACUITY: OU: 1

## 2021-07-30 NOTE — PROGRESS NOTES
Subjective:     Ilya Rayo is a 61 y.o. male who presents for Other (Possible rosacea flare up x 1 week. Last episode 10years ago. Used minocycline in the past with great benefit. )       Other  This is a chronic problem. The problem has been gradually worsening. Associated symptoms include a rash. Pertinent negatives include no fever.     Patient reports about 1 week ago, he started to experience a flareup of rosacea symptoms.  Reports a red, inflamed, itchy rash on both sides of his face with pustules with similar symptoms at the back of his neck.  Reports a history of rosacea in the past.  Reports that today, symptoms seemed to be improving, but he is interested in initiating treatment.  Has tried MetroGel which has not helped.  Reports minocycline has been helpful and is interested with similar treatment.  Has seen dermatology in the past.  Denies fever or other symptoms.    Patient was screened prior to rooming and denied COVID-19 diagnosis or contact with a person who has been diagnosed or is suspected to have COVID-19. During this visit, appropriate PPE was worn, hand hygiene was performed, and the patient and any visitors were masked.     PMH:  has a past medical history of Back pain, Bronchitis, Ringing in ears, Sciatica, Shortness of breath, and Wheezing.    MEDS:   Current Outpatient Medications:   •  minocycline (DYNACIN) 100 MG tablet, Take 1 tablet by mouth 2 times a day for 10 days. Refill and continue for up to 4 weeks if rosacea symptoms not resolved., Disp: 20 tablet, Rfl: 2  •  rosuvastatin (CRESTOR) 20 MG Tab, Take 1 tablet by mouth every evening., Disp: 90 tablet, Rfl: 3    ALLERGIES:   Allergies   Allergen Reactions   • Other Food      [place] , trout      SURGHX:   Past Surgical History:   Procedure Laterality Date   • HYSTERECTOMY LAPAROSCOPY     • TONSILLECTOMY       SOCHX:  reports that he has never smoked. He has never used smokeless tobacco. He reports current alcohol use. He  reports that he does not use drugs.     FH: Reviewed with patient, not pertinent to this visit.    Review of Systems   Constitutional: Negative.  Negative for fever and malaise/fatigue.   Skin: Positive for itching and rash.   All other systems reviewed and are negative.    Additional details per HPI.      Objective:     /70 (BP Location: Left arm, Patient Position: Sitting, BP Cuff Size: Adult)   Pulse 66   Temp 36.9 °C (98.5 °F) (Temporal)   Resp 14   Ht 1.829 m (6')   Wt 74.8 kg (165 lb)   SpO2 97%   BMI 22.38 kg/m²     Physical Exam  Vitals reviewed.   Constitutional:       General: He is not in acute distress.     Appearance: He is well-developed. He is not ill-appearing or toxic-appearing.   HENT:      Head: Normocephalic.      Right Ear: External ear normal.      Left Ear: External ear normal.   Eyes:      General: Vision grossly intact.      Extraocular Movements: Extraocular movements intact.      Conjunctiva/sclera: Conjunctivae normal.   Cardiovascular:      Rate and Rhythm: Normal rate.   Pulmonary:      Effort: Pulmonary effort is normal. No respiratory distress.   Musculoskeletal:         General: No deformity. Normal range of motion.      Cervical back: Normal range of motion.   Skin:     General: Skin is warm and dry.      Coloration: Skin is not pale.      Findings: Erythema (Mild erythema at bilateral face with left erythematous papular lesion) present.   Neurological:      Mental Status: He is alert and oriented to person, place, and time.      Sensory: No sensory deficit.      Motor: No weakness.      Coordination: Coordination normal.   Psychiatric:         Behavior: Behavior normal. Behavior is cooperative.       Assessment/Plan:     1. Rosacea  - minocycline (DYNACIN) 100 MG tablet; Take 1 tablet by mouth 2 times a day for 10 days. Refill and continue for up to 4 weeks if rosacea symptoms not resolved.  Dispense: 20 tablet; Refill: 2    2. History of rosacea  - minocycline  (DYNACIN) 100 MG tablet; Take 1 tablet by mouth 2 times a day for 10 days. Refill and continue for up to 4 weeks if rosacea symptoms not resolved.  Dispense: 20 tablet; Refill: 2    3. Facial rash  - minocycline (DYNACIN) 100 MG tablet; Take 1 tablet by mouth 2 times a day for 10 days. Refill and continue for up to 4 weeks if rosacea symptoms not resolved.  Dispense: 20 tablet; Refill: 2    Rx as above sent electronically.    Differential diagnosis, natural history, supportive care, over-the-counter symptom management per 's instructions, close monitoring, and indications for immediate follow-up discussed.     Patient advised to: Return for 1) Symptoms that worsen/don't improve, or go to ER, 2) Follow up with primary care in 7-10 days.    All questions answered. Patient agrees with the plan of care.    Discharge summary provided through [email or web address].    Billing note: this visit involved a chronic illness with exacerbation/progression (flare-up of chronic problem) with prescription drug management thus supporting a moderate MDM.    Billing note: patient billed as a new patient as the patient has not received any professional medical services from myself or another provider of the same specialty (family medicine) who belongs to the same group practice within the previous 3 years. Has seen cardiology and pulmonary providers in the past 3 years within [facility].

## 2021-07-30 NOTE — PATIENT INSTRUCTIONS
Rosacea  Rosacea is a long-term (chronic) condition that affects the skin of the face, including the cheeks, nose, forehead, and chin. This condition can also affect the eyes. Rosacea causes blood vessels near the surface of the skin to enlarge, which results in redness.  What are the causes?  The cause of this condition is not known. Certain triggers can make rosacea worse, including:  · Hot baths.  · Exercise.  · Sunlight.  · Very hot or cold temperatures.  · Hot or spicy foods and drinks.  · Drinking alcohol.  · Stress.  · Taking blood pressure medicine.  · Long-term use of topical steroids on the face.  What increases the risk?  You are more likely to develop this condition if you:  · Are older than 30 years of age.  · Are a woman.  · Have light-colored skin (light complexion).  · Have a family history of rosacea.  What are the signs or symptoms?  Symptoms of this condition include:  · Redness of the face.  · Red bumps or pimples on the face.  · A red, enlarged nose.  · Blushing easily.  · Red lines on the skin.  · Irritated, burning, or itchy feeling in the eyes.  · Swollen eyelids.  · Drainage from the eyes.  · Feeling like there is something in your eye.  How is this diagnosed?  This condition is diagnosed with a medical history and physical exam.  How is this treated?  There is no cure for this condition, but treatment can help to control your symptoms. Your health care provider may recommend that you see a skin specialist (dermatologist). Treatment may include:  · Medicines that are applied to the skin or taken by mouth (orally). This can include antibiotic medicines.  · Laser treatment to improve the appearance of the skin.  · Surgery. This is rare.  Your health care provider will also recommend the best way to take care of your skin. Even after your skin improves, you will likely need to continue treatment to prevent your rosacea from coming back.  Follow these instructions at home:  Skin care  Take care  of your skin as told by your health care provider. You may be told to do these things:  · Wash your skin gently two or more times each day.  · Use mild soap.  · Use a sunscreen or sunblock with SPF 30 or greater.  · Use gentle cosmetics that are meant for sensitive skin.  · Shave with an electric shaver instead of a blade.  Lifestyle  · Try to keep track of what foods trigger this condition. Avoid any triggers. These may include:  ? Spicy foods.  ? Seafood.  ? Cheese.  ? Hot liquids.  ? Nuts.  ? Chocolate.  ? Iodized salt.  · Do not drink alcohol.  · Avoid extremely cold or hot temperatures.  · Try to reduce your stress. If you need help, talk with your health care provider.  · When you exercise, do these things to stay cool:  ? Limit sun exposure to your face.  ? Use a fan.  ? Do shorter and more frequent intervals of exercise.  General instructions  · Take and apply over-the-counter and prescription medicines only as told by your health care provider.  · If you were prescribed an antibiotic medicine, apply it or take it as told by your health care provider. Do not stop using the antibiotic even if your condition improves.  · If your eyelids are affected, apply warm compresses to them. Do this as told by your health care provider.  · Keep all follow-up visits as told by your health care provider. This is important.  Contact a health care provider if:  · Your symptoms get worse.  · Your symptoms do not improve after 2 months of treatment.  · You have new symptoms.  · You have any changes in vision or you have problems with your eyes, such as redness or itching.  · You feel depressed.  · You lose your appetite.  · You have trouble concentrating.  Summary  · Rosacea is a long-term (chronic) condition that affects the skin of the face, including the cheeks, nose, forehead, and chin.  · Take care of your skin as told by your health care provider.  · Take and apply over-the-counter and prescription medicines only as told  by your health care provider.  · Contact a health care provider if your symptoms get worse or if you have any changes in vision or other problems with your eyes, such as redness or itching.  · Keep all follow-up visits as told by your health care provider. This is important.  This information is not intended to replace advice given to you by your health care provider. Make sure you discuss any questions you have with your health care provider.  Document Released: 01/25/2006 Document Revised: 05/22/2019 Document Reviewed: 05/22/2019  Elsevier Patient Education © 2020 Elsevier Inc[employer]

## 2021-11-30 ENCOUNTER — TELEPHONE (OUTPATIENT)
Dept: SCHEDULING | Facility: IMAGING CENTER | Age: 61
End: 2021-11-30

## 2021-11-30 SDOH — ECONOMIC STABILITY: TRANSPORTATION INSECURITY
IN THE PAST 12 MONTHS, HAS LACK OF TRANSPORTATION KEPT YOU FROM MEETINGS, WORK, OR FROM GETTING THINGS NEEDED FOR DAILY LIVING?: NO

## 2021-11-30 SDOH — ECONOMIC STABILITY: HOUSING INSECURITY
IN THE LAST 12 MONTHS, WAS THERE A TIME WHEN YOU DID NOT HAVE A STEADY PLACE TO SLEEP OR SLEPT IN A SHELTER (INCLUDING NOW)?: NO

## 2021-11-30 SDOH — ECONOMIC STABILITY: FOOD INSECURITY: WITHIN THE PAST 12 MONTHS, THE FOOD YOU BOUGHT JUST DIDN'T LAST AND YOU DIDN'T HAVE MONEY TO GET MORE.: NEVER TRUE

## 2021-11-30 SDOH — HEALTH STABILITY: PHYSICAL HEALTH: ON AVERAGE, HOW MANY MINUTES DO YOU ENGAGE IN EXERCISE AT THIS LEVEL?: 60 MIN

## 2021-11-30 SDOH — ECONOMIC STABILITY: INCOME INSECURITY: IN THE LAST 12 MONTHS, WAS THERE A TIME WHEN YOU WERE NOT ABLE TO PAY THE MORTGAGE OR RENT ON TIME?: NO

## 2021-11-30 SDOH — HEALTH STABILITY: PHYSICAL HEALTH: ON AVERAGE, HOW MANY DAYS PER WEEK DO YOU ENGAGE IN MODERATE TO STRENUOUS EXERCISE (LIKE A BRISK WALK)?: 4 DAYS

## 2021-11-30 SDOH — ECONOMIC STABILITY: FOOD INSECURITY: WITHIN THE PAST 12 MONTHS, YOU WORRIED THAT YOUR FOOD WOULD RUN OUT BEFORE YOU GOT MONEY TO BUY MORE.: NEVER TRUE

## 2021-11-30 SDOH — ECONOMIC STABILITY: TRANSPORTATION INSECURITY
IN THE PAST 12 MONTHS, HAS THE LACK OF TRANSPORTATION KEPT YOU FROM MEDICAL APPOINTMENTS OR FROM GETTING MEDICATIONS?: NO

## 2021-11-30 SDOH — ECONOMIC STABILITY: TRANSPORTATION INSECURITY
IN THE PAST 12 MONTHS, HAS LACK OF RELIABLE TRANSPORTATION KEPT YOU FROM MEDICAL APPOINTMENTS, MEETINGS, WORK OR FROM GETTING THINGS NEEDED FOR DAILY LIVING?: NO

## 2021-11-30 SDOH — HEALTH STABILITY: MENTAL HEALTH
STRESS IS WHEN SOMEONE FEELS TENSE, NERVOUS, ANXIOUS, OR CAN'T SLEEP AT NIGHT BECAUSE THEIR MIND IS TROUBLED. HOW STRESSED ARE YOU?: TO SOME EXTENT

## 2021-11-30 SDOH — ECONOMIC STABILITY: INCOME INSECURITY: HOW HARD IS IT FOR YOU TO PAY FOR THE VERY BASICS LIKE FOOD, HOUSING, MEDICAL CARE, AND HEATING?: NOT HARD AT ALL

## 2021-11-30 SDOH — ECONOMIC STABILITY: HOUSING INSECURITY: IN THE LAST 12 MONTHS, HOW MANY PLACES HAVE YOU LIVED?: 1

## 2021-11-30 ASSESSMENT — SOCIAL DETERMINANTS OF HEALTH (SDOH)
HOW OFTEN DO YOU ATTENT MEETINGS OF THE CLUB OR ORGANIZATION YOU BELONG TO?: 1 TO 4 TIMES PER YEAR
IN A TYPICAL WEEK, HOW MANY TIMES DO YOU TALK ON THE PHONE WITH FAMILY, FRIENDS, OR NEIGHBORS?: ONCE A WEEK
WITHIN THE PAST 12 MONTHS, YOU WORRIED THAT YOUR FOOD WOULD RUN OUT BEFORE YOU GOT THE MONEY TO BUY MORE: NEVER TRUE
IN A TYPICAL WEEK, HOW MANY TIMES DO YOU TALK ON THE PHONE WITH FAMILY, FRIENDS, OR NEIGHBORS?: ONCE A WEEK
HOW OFTEN DO YOU ATTENT MEETINGS OF THE CLUB OR ORGANIZATION YOU BELONG TO?: 1 TO 4 TIMES PER YEAR
HOW OFTEN DO YOU HAVE SIX OR MORE DRINKS ON ONE OCCASION: NEVER
HOW HARD IS IT FOR YOU TO PAY FOR THE VERY BASICS LIKE FOOD, HOUSING, MEDICAL CARE, AND HEATING?: NOT HARD AT ALL
DO YOU BELONG TO ANY CLUBS OR ORGANIZATIONS SUCH AS CHURCH GROUPS UNIONS, FRATERNAL OR ATHLETIC GROUPS, OR SCHOOL GROUPS?: YES
HOW OFTEN DO YOU ATTEND CHURCH OR RELIGIOUS SERVICES?: NEVER
DO YOU BELONG TO ANY CLUBS OR ORGANIZATIONS SUCH AS CHURCH GROUPS UNIONS, FRATERNAL OR ATHLETIC GROUPS, OR SCHOOL GROUPS?: YES
HOW OFTEN DO YOU HAVE A DRINK CONTAINING ALCOHOL: MONTHLY OR LESS
HOW OFTEN DO YOU GET TOGETHER WITH FRIENDS OR RELATIVES?: ONCE A WEEK
HOW MANY DRINKS CONTAINING ALCOHOL DO YOU HAVE ON A TYPICAL DAY WHEN YOU ARE DRINKING: 1 OR 2
HOW OFTEN DO YOU GET TOGETHER WITH FRIENDS OR RELATIVES?: ONCE A WEEK
HOW OFTEN DO YOU ATTEND CHURCH OR RELIGIOUS SERVICES?: NEVER

## 2021-11-30 ASSESSMENT — LIFESTYLE VARIABLES
HOW OFTEN DO YOU HAVE SIX OR MORE DRINKS ON ONE OCCASION: NEVER
HOW MANY STANDARD DRINKS CONTAINING ALCOHOL DO YOU HAVE ON A TYPICAL DAY: 1 OR 2
HOW OFTEN DO YOU HAVE A DRINK CONTAINING ALCOHOL: MONTHLY OR LESS

## 2021-12-02 ENCOUNTER — OFFICE VISIT (OUTPATIENT)
Dept: MEDICAL GROUP | Facility: PHYSICIAN GROUP | Age: 61
End: 2021-12-02
Payer: COMMERCIAL

## 2021-12-02 VITALS
HEIGHT: 72 IN | DIASTOLIC BLOOD PRESSURE: 80 MMHG | TEMPERATURE: 99 F | HEART RATE: 64 BPM | WEIGHT: 170 LBS | RESPIRATION RATE: 16 BRPM | SYSTOLIC BLOOD PRESSURE: 124 MMHG | OXYGEN SATURATION: 98 % | BODY MASS INDEX: 23.03 KG/M2

## 2021-12-02 DIAGNOSIS — R35.0 URINARY FREQUENCY: ICD-10-CM

## 2021-12-02 DIAGNOSIS — Z00.00 WELLNESS EXAMINATION: ICD-10-CM

## 2021-12-02 DIAGNOSIS — L71.9 ROSACEA: ICD-10-CM

## 2021-12-02 DIAGNOSIS — R42 DIZZINESS: ICD-10-CM

## 2021-12-02 DIAGNOSIS — H81.03 MENIERE'S DISEASE OF BOTH EARS: ICD-10-CM

## 2021-12-02 DIAGNOSIS — Z12.5 SCREENING FOR PROSTATE CANCER: ICD-10-CM

## 2021-12-02 PROCEDURE — 99214 OFFICE O/P EST MOD 30 MIN: CPT | Performed by: FAMILY MEDICINE

## 2021-12-02 RX ORDER — MINOCYCLINE HYDROCHLORIDE 100 MG/1
100 CAPSULE ORAL 2 TIMES DAILY
Qty: 180 CAPSULE | Refills: 1 | Status: SHIPPED | OUTPATIENT
Start: 2021-12-02 | End: 2022-03-02

## 2021-12-02 RX ORDER — ROSUVASTATIN CALCIUM 20 MG/1
TABLET, COATED ORAL
COMMUNITY
End: 2021-12-02

## 2021-12-02 ASSESSMENT — PATIENT HEALTH QUESTIONNAIRE - PHQ9: CLINICAL INTERPRETATION OF PHQ2 SCORE: 0

## 2021-12-02 NOTE — LETTER
Count includes the [facility]  Felisha Louis M.D.  [address]  [facility] [address]  Fax: [phone]   Authorization for Release/Disclosure of   Protected Health Information   Name: ILYA TEMPLETON : 1960 SSN: [id number]   Address: [address] [place] [address] [place] [address] Phone:    [phone] (home)    I authorize the entity listed below to release/disclose the PHI below to:   Count includes the [facility]/Felisha Louis M.D. and Felisha Louis M.D.   Provider or Entity Name:  [facility]    Address   [facility], [facility], [facility]  [address] [place] [address], [place] [address]  Phone:  [phone]    Fax:  [phone]   Reason for request: continuity of care   Information to be released:    [  ] LAST COLONOSCOPY,  including any PATH REPORT and follow-up  [  ] LAST FIT/COLOGUARD RESULT [  ] LAST DEXA  [  ] LAST MAMMOGRAM  [  ] LAST PAP  [  ] LAST LABS [  ] RETINA EXAM REPORT  [  ] IMMUNIZATION RECORDS  [ xxx ] Release all info      [  ] Check here and initial the line next to each item to release ALL health information INCLUDING  _____ Care and treatment for drug and / or alcohol abuse  _____ HIV testing, infection status, or AIDS  _____ Genetic Testing    DATES OF SERVICE OR TIME PERIOD TO BE DISCLOSED: _____________  I understand and acknowledge that:  * This Authorization may be revoked at any time by you in writing, except if your health information has already been used or disclosed.  * Your health information that will be used or disclosed as a result of you signing this authorization could be re-disclosed by the recipient. If this occurs, your re-disclosed health information may no longer be protected by State or Federal laws.  * You may refuse to sign this Authorization. Your refusal will not affect your ability to obtain treatment.  * This Authorization becomes effective upon signing and will  on (date) __________.      If no date is indicated, this Authorization will  one (1) year from the signature date.    Name: Ilya Guzman  Girma    Signature:continuity of care   Date:     12/2/2021       PLEASE FAX REQUESTED RECORDS BACK TO: [phone]

## 2021-12-02 NOTE — PROGRESS NOTES
Subjective:     CC:   Chief Complaint   Patient presents with   • Establish Care   • Dizziness     couples months. light headed. disoriented       HPI:   Ilya presents today to establish care.  Patient is seeing cardiology due to the fact they discover a aneurysm that they are closely monitoring.  Patient is also seeing endocrine due to the fact that he has a thyroid goiter it already has been biopsied one was if he for malignancy the other 1 was negative patient is seen in his provider yearly at this time.  Patient also has issues with ringing of the ears and decreased hearing loss and now some slight dizziness but the dizziness has been going on for 4 to 5 months.  Patient has  seen ear nose and throat for  Ménière's years ago.  Patient also has been seen by dermatology and was on minocycline twice a day capsules for his rosacea and also he has some pustule formation is sometimes occurs in his scalp area.  Patient also states that he would like his prostate test done due to the fact that sometimes he has some issues with urination.    Past Medical History:   Diagnosis Date   • Back pain    • Bronchitis    • Ringing in ears    • Sciatica    • Shortness of breath    • Wheezing        Social History     Tobacco Use   • Smoking status: Never Smoker   • Smokeless tobacco: Never Used   Vaping Use   • Vaping Use: Never used   Substance Use Topics   • Alcohol use: Yes   • Drug use: No       Current Outpatient Medications Ordered in Epic   Medication Sig Dispense Refill   • minocycline (MINOCIN) 100 MG Cap Take 1 Capsule by mouth 2 times a day for 90 days. 180 Capsule 1   • rosuvastatin (CRESTOR) 20 MG Tab Take 1 tablet by mouth every evening. 90 tablet 3     No current Epic-ordered facility-administered medications on file.       Allergies:  Other food    Health Maintenance: Completed    ROS:  Gen: no fevers/chills  Eyes: no changes in vision  ENT: no sore throat, no hearing loss, no bloody nose  Pulm: no sob, no  cough  CV: no chest pain, no palpitations  GI: no nausea/vomiting, no diarrhea  Skin: no rash  Neuro: no headaches, no numbness/tingling  Heme/Lymph: no easy bruising    Objective:     Exam:  /80   Pulse 64   Temp 37.2 °C (99 °F) (Temporal)   Resp 16   Ht 1.829 m (6')   Wt 77.1 kg (170 lb)   SpO2 98%   BMI 23.06 kg/m²  Body mass index is 23.06 kg/m².    Gen: Alert and oriented, No apparent distress.  Skin: Warm and dry.  No obvious lesions.  Eyes: Sclera wnl Pupils normal in size  ENT: Canals wnl and TM are not red, mouth negative redness or exudates  Lungs: Normal effort, CTA bilaterally, no wheezes, rhonchi, or rales  CV: Regular rate and rhythm. No murmurs, rubs, or gallops.  ABD: Soft non-tender no organomegaly  Musculoskeletal: Normal gait. No extremity cyanosis, clubbing, or edema.  Neuro: Oriented to person, place and time  Psych: Mood is wnl       Labs: Fasting labs were ordered patient unfortunately cannot go to [facility] so he either has to go to [facility] or [facility] I did give him his lab orders    Assessment & Plan:     61 y.o. male with the following -     1. Dizziness  Recommend we get lab work done patient to follow-up with me.  Patient denies any chest discomfort or issues nor any palpitations.  This is a acute problem  - CBC WITH DIFFERENTIAL; Future    2. Urinary frequency  We will get PSA and urine done this is a chronic problem  - URINALYSIS,CULTURE IF INDICATED; Future    3. Rosacea  Patient wants to be started back on the minocycline patient states to ointments do not help him he was seen by dermatologist for this in the past and this is the only thing that works.  This is a chronic problem    4. Wellness examination  - Comp Metabolic Panel; Future  - Lipid Profile; Future    5. Screening for prostate cancer  - PROSTATE SPECIFIC AG SCREENING; Future    6. Meniere's  Will write referral to ENT this is a chronic problem  Other orders  - MINOCYCLINE HCL PO       Return in about 2 weeks  (around 12/16/2021).    Please note that this dictation was created using voice recognition software. I have made every reasonable attempt to correct obvious errors, but I expect that there are errors of grammar and possibly content that I did not discover before finalizing the note.

## 2021-12-09 ENCOUNTER — OFFICE VISIT (OUTPATIENT)
Dept: MEDICAL GROUP | Facility: PHYSICIAN GROUP | Age: 61
End: 2021-12-09
Payer: COMMERCIAL

## 2021-12-09 VITALS
TEMPERATURE: 98.6 F | OXYGEN SATURATION: 98 % | SYSTOLIC BLOOD PRESSURE: 126 MMHG | HEART RATE: 58 BPM | RESPIRATION RATE: 16 BRPM | WEIGHT: 170.4 LBS | HEIGHT: 72 IN | BODY MASS INDEX: 23.08 KG/M2 | DIASTOLIC BLOOD PRESSURE: 76 MMHG

## 2021-12-09 DIAGNOSIS — R42 DIZZINESS: ICD-10-CM

## 2021-12-09 DIAGNOSIS — H93.13 TINNITUS OF BOTH EARS: ICD-10-CM

## 2021-12-09 DIAGNOSIS — J34.89 SINUS PRESSURE: ICD-10-CM

## 2021-12-09 PROCEDURE — 99214 OFFICE O/P EST MOD 30 MIN: CPT | Performed by: FAMILY MEDICINE

## 2021-12-09 NOTE — PROGRESS NOTES
Subjective:     CC:   Chief Complaint   Patient presents with   • Follow-Up       HPI:   Ilya presents today to review labs.  Patient states that he has some some sinus headache he has been taking Sudafed he has it off and on but it is not working as well.  Patient denies blowing any discolored material from his nose.  Patient states his dizziness has been off and on for the last couple months but he does have ringing of his ears for years.  Patient denies any issues with his urination I did discuss his PSA which is normal if he has any problems I can always refer him to urology.    Past Medical History:   Diagnosis Date   • Back pain    • Bronchitis    • Ringing in ears    • Sciatica    • Shortness of breath    • Wheezing        Social History     Tobacco Use   • Smoking status: Never Smoker   • Smokeless tobacco: Never Used   Vaping Use   • Vaping Use: Never used   Substance Use Topics   • Alcohol use: Yes   • Drug use: No       Current Outpatient Medications Ordered in Epic   Medication Sig Dispense Refill   • minocycline (MINOCIN) 100 MG Cap Take 1 Capsule by mouth 2 times a day for 90 days. 180 Capsule 1   • rosuvastatin (CRESTOR) 20 MG Tab Take 1 tablet by mouth every evening. 90 tablet 3     No current Epic-ordered facility-administered medications on file.       Allergies:  Other food    Health Maintenance: Completed    ROS:  Gen: no fevers/chills, no changes in weight  Eyes: no changes in vision  ENT: no sore throat, no hearing loss, no bloody nose  Pulm: no sob, no cough  CV: no chest pain, no palpitations  GI: no nausea/vomiting, no diarrhea  Skin: no rash  Neuro: no headaches, no numbness/tingling  Heme/Lymph: no easy bruising    Objective:     Exam:  /76   Pulse (!) 58   Temp 37 °C (98.6 °F)   Resp 16   Ht 1.829 m (6')   Wt 77.3 kg (170 lb 6.4 oz)   SpO2 98%   BMI 23.11 kg/m²  Body mass index is 23.11 kg/m².    Gen: Alert and oriented, No apparent distress.  Skin: Warm and dry.  No  obvious lesions.  Eyes: Sclera wnl Pupils normal in size  Lungs: Normal effort, CTA bilaterally, no wheezes, rhonchi, or rales  CV: Regular rate and rhythm. No murmurs, rubs, or gallops.  Musculoskeletal: Normal gait. No extremity cyanosis, clubbing, or edema.  Neuro: Oriented to person, place and time  Psych: Mood is wnl       Assessment & Plan:     61 y.o. male with the following -     1. Tinnitus of both ears  Patient feels a ringing that he has had for years is not Ménière's but would recommend seeing ear nose and throat I do not know if the ringing in the dizziness are all related but he is also having some sinus pressure and that would be good for him to see ENT concerning this.  This is a chronic problem.  Patient was given the phone number of ear nose and throat I would recommend he contact them and schedule appointment  2. Dizziness  I would like him to be evaluated by ENT went through all his lab work all are within normal limits.  If it is good to be a little while until he sees ear nose and throat I can always order a CAT scan on him.  Patient to contact me if the ENT appointment will be taken a little while.  This is acute problem    3. Sinus pressure  Would recommend patient start using Flonase nasal spray which she can buy over-the-counter he needs to give it a couple weeks for it to kick in.  This is a chronic problem       Return if symptoms worsen or fail to improve.    Please note that this dictation was created using voice recognition software. I have made every reasonable attempt to correct obvious errors, but I expect that there are errors of grammar and possibly content that I did not discover before finalizing the note.

## 2022-05-25 ENCOUNTER — NON-PROVIDER VISIT (OUTPATIENT)
Dept: CARDIOLOGY | Facility: MEDICAL CENTER | Age: 62
End: 2022-05-25
Payer: COMMERCIAL

## 2022-05-25 ENCOUNTER — TELEPHONE (OUTPATIENT)
Dept: CARDIOLOGY | Facility: MEDICAL CENTER | Age: 62
End: 2022-05-25
Payer: COMMERCIAL

## 2022-05-25 DIAGNOSIS — I49.1 APC (ATRIAL PREMATURE CONTRACTIONS): ICD-10-CM

## 2022-05-25 DIAGNOSIS — I49.3 PVC'S (PREMATURE VENTRICULAR CONTRACTIONS): ICD-10-CM

## 2022-05-25 DIAGNOSIS — I49.8 BIGEMINY: ICD-10-CM

## 2022-05-25 DIAGNOSIS — I47.19 ATRIAL TACHYCARDIA (HCC): ICD-10-CM

## 2022-05-25 DIAGNOSIS — R42 DIZZINESS: ICD-10-CM

## 2022-05-25 DIAGNOSIS — R00.8 TRIGEMINY: ICD-10-CM

## 2022-05-25 NOTE — TELEPHONE ENCOUNTER
Yesterday for most of the day, HR in 30's - device used was a [employer] device.     Pt has been suffering from dizziness for past 7-8 months. However heart rate issue only started yesterday. Pt did work out still, heart rate did increase but then went back to normal 50's. Today heart rate has dropped again.     Pt has f/u with RO on 6/1/22. Advised ER precautions for worsening symptoms. Otherwise, d/w RO - STAT 48 hr holter ordered- placed on schedule for today.     Patient agreeable to plan

## 2022-05-25 NOTE — TELEPHONE ENCOUNTER
[facility]    Caller: Ilya Rayo    Reported Symptoms: LOW HEART RATE    Recent Blood Pressure/Heart Rate Reading:     No current BP reading / HR: LOW 30'S    Upcoming appointment: 6/1 [facility]    Callback Number: [phone] (home)

## 2022-05-25 NOTE — PROGRESS NOTES
STAT 48 hour Holter monitor placed, per Sagar Beckwith M.D.  >In clinic hook up, monitor S/N [id number].

## 2022-05-27 ENCOUNTER — TELEPHONE (OUTPATIENT)
Dept: CARDIOLOGY | Facility: MEDICAL CENTER | Age: 62
End: 2022-05-27
Payer: COMMERCIAL

## 2022-05-31 LAB — EKG IMPRESSION: NORMAL

## 2022-05-31 PROCEDURE — 93224 XTRNL ECG REC UP TO 48 HRS: CPT | Performed by: INTERNAL MEDICINE

## 2022-06-01 ENCOUNTER — OFFICE VISIT (OUTPATIENT)
Dept: CARDIOLOGY | Facility: MEDICAL CENTER | Age: 62
End: 2022-06-01
Payer: COMMERCIAL

## 2022-06-01 VITALS
OXYGEN SATURATION: 99 % | RESPIRATION RATE: 14 BRPM | HEART RATE: 62 BPM | WEIGHT: 178 LBS | HEIGHT: 72 IN | BODY MASS INDEX: 24.11 KG/M2 | SYSTOLIC BLOOD PRESSURE: 110 MMHG | DIASTOLIC BLOOD PRESSURE: 70 MMHG

## 2022-06-01 DIAGNOSIS — R93.1 AGATSTON CORONARY ARTERY CALCIUM SCORE BETWEEN 200 AND 399: ICD-10-CM

## 2022-06-01 DIAGNOSIS — Z00.6 RESEARCH STUDY PATIENT: ICD-10-CM

## 2022-06-01 DIAGNOSIS — J34.89 SINUS PRESSURE: ICD-10-CM

## 2022-06-01 DIAGNOSIS — I71.20 THORACIC AORTIC ANEURYSM WITHOUT RUPTURE (HCC): ICD-10-CM

## 2022-06-01 DIAGNOSIS — R42 DIZZINESS: ICD-10-CM

## 2022-06-01 DIAGNOSIS — R93.1 AGATSTON CAC SCORE 200-399: ICD-10-CM

## 2022-06-01 PROCEDURE — 99214 OFFICE O/P EST MOD 30 MIN: CPT | Performed by: INTERNAL MEDICINE

## 2022-06-01 RX ORDER — MINOCYCLINE HYDROCHLORIDE 100 MG/1
CAPSULE ORAL 2 TIMES DAILY
COMMUNITY
Start: 2022-05-31

## 2022-06-01 RX ORDER — ROSUVASTATIN CALCIUM 20 MG/1
20 TABLET, COATED ORAL EVERY EVENING
Qty: 90 TABLET | Refills: 3 | Status: SHIPPED
Start: 2022-06-01 | End: 2023-06-01

## 2022-06-01 ASSESSMENT — ENCOUNTER SYMPTOMS
LOSS OF CONSCIOUSNESS: 0
PND: 0
EYES NEGATIVE: 1
COUGH: 0
PALPITATIONS: 0
CARDIOVASCULAR NEGATIVE: 1
RESPIRATORY NEGATIVE: 1
SORE THROAT: 0
SPUTUM PRODUCTION: 0
HEMOPTYSIS: 0
MUSCULOSKELETAL NEGATIVE: 1
DIZZINESS: 0
CONSTITUTIONAL NEGATIVE: 1
FEVER: 0
WHEEZING: 0
CLAUDICATION: 0
STRIDOR: 0
SHORTNESS OF BREATH: 0
GASTROINTESTINAL NEGATIVE: 1
BRUISES/BLEEDS EASILY: 0
NEUROLOGICAL NEGATIVE: 1
CHILLS: 0
ORTHOPNEA: 0
WEAKNESS: 0

## 2022-06-01 NOTE — PROGRESS NOTES
Chief Complaint   Patient presents with   • Follow-Up     F/V DX: Thoracic aortic aneurysm without rupture       Subjective:   Ilya Rayo is a 61 y.o. male who presents today as a follow-up for his thoracic aortic aneurysm and elevated calcium score.    Since he was last seen he being seen for an urgent visit for his dizziness.  He wore Holter monitor showed frequent PVCs but do not correlate to any of his symptoms of dizziness.  He seen ENT and had multiple studies done showing no cause.  He is frustrated by this.  He is wondering if the Crestor can be doing at though he has been on it for more than a year.    Past Medical History:   Diagnosis Date   • Back pain    • Bronchitis    • Ringing in ears    • Sciatica    • Shortness of breath    • Wheezing      Past Surgical History:   Procedure Laterality Date   • TONSILLECTOMY       Family History   Problem Relation Age of Onset   • Lung Disease Father    • Lung Disease Paternal Grandfather    • Heart Disease Paternal Grandfather      Social History     Socioeconomic History   • Marital status:      Spouse name: Not on file   • Number of children: Not on file   • Years of education: Not on file   • Highest education level: Doctorate   Occupational History   • Not on file   Tobacco Use   • Smoking status: Never Smoker   • Smokeless tobacco: Never Used   Vaping Use   • Vaping Use: Never used   Substance and Sexual Activity   • Alcohol use: Yes   • Drug use: No   • Sexual activity: Yes     Partners: Female   Other Topics Concern   • Not on file   Social History Narrative   • Not on file     Social Determinants of Health     Financial Resource Strain: Low Risk    • Difficulty of Paying Living Expenses: Not hard at all   Food Insecurity: No Food Insecurity   • Worried About Running Out of Food in the Last Year: Never true   • Ran Out of Food in the Last Year: Never true   Transportation Needs: No Transportation Needs   • Lack of Transportation (Medical): No    • Lack of Transportation (Non-Medical): No   Physical Activity: Sufficiently Active   • Days of Exercise per Week: 4 days   • Minutes of Exercise per Session: 60 min   Stress: Stress Concern Present   • Feeling of Stress : To some extent   Social Connections: Moderately Isolated   • Frequency of Communication with Friends and Family: Once a week   • Frequency of Social Gatherings with Friends and Family: Once a week   • Attends Sikh Services: Never   • Active Member of Clubs or Organizations: Yes   • Attends Club or Organization Meetings: 1 to 4 times per year   • Marital Status:    Intimate Partner Violence: Not on file   Housing Stability: Low Risk    • Unable to Pay for Housing in the Last Year: No   • Number of Places Lived in the Last Year: 1   • Unstable Housing in the Last Year: No     Allergies   Allergen Reactions   • Other Food      [place] , trout      Outpatient Encounter Medications as of 6/1/2022   Medication Sig Dispense Refill   • minocycline (MINOCIN) 100 MG Cap      • rosuvastatin (CRESTOR) 20 MG Tab Take 1 Tablet by mouth every evening. 90 Tablet 3   • [DISCONTINUED] rosuvastatin (CRESTOR) 20 MG Tab Take 1 tablet by mouth every evening. 90 tablet 3     No facility-administered encounter medications on file as of 6/1/2022.     Review of Systems   Constitutional: Negative.  Negative for chills, fever and malaise/fatigue.   HENT: Negative.  Negative for sore throat.    Eyes: Negative.    Respiratory: Negative.  Negative for cough, hemoptysis, sputum production, shortness of breath, wheezing and stridor.    Cardiovascular: Negative.  Negative for chest pain, palpitations, orthopnea, claudication, leg swelling and PND.   Gastrointestinal: Negative.    Genitourinary: Negative.    Musculoskeletal: Negative.    Skin: Negative.    Neurological: Negative.  Negative for dizziness, loss of consciousness and weakness.   Endo/Heme/Allergies: Negative.  Does not bruise/bleed easily.   All other  systems reviewed and are negative.       Objective:   /70   Pulse 62   Resp 14   Ht 1.829 m (6')   Wt 80.7 kg (178 lb)   SpO2 99%   BMI 24.14 kg/m²     Physical Exam  Vitals and nursing note reviewed.   Constitutional:       General: He is not in acute distress.     Appearance: He is well-developed. He is not diaphoretic.   HENT:      Head: Normocephalic and atraumatic.      Right Ear: External ear normal.      Left Ear: External ear normal.      Nose: Nose normal.      Mouth/Throat:      Pharynx: No oropharyngeal exudate.   Eyes:      General: No scleral icterus.        Right eye: No discharge.         Left eye: No discharge.      Conjunctiva/sclera: Conjunctivae normal.      Pupils: Pupils are equal, round, and reactive to light.   Neck:      Vascular: No JVD.   Cardiovascular:      Rate and Rhythm: Normal rate and regular rhythm.      Heart sounds: No murmur heard.    No friction rub. No gallop.   Pulmonary:      Effort: Pulmonary effort is normal. No respiratory distress.      Breath sounds: No stridor. No wheezing or rales.   Chest:      Chest wall: No tenderness.   Abdominal:      General: There is no distension.      Palpations: Abdomen is soft.      Tenderness: There is no guarding.   Musculoskeletal:         General: No tenderness or deformity. Normal range of motion.      Cervical back: Neck supple.   Skin:     General: Skin is warm and dry.      Coloration: Skin is not pale.      Findings: No erythema or rash.   Neurological:      Mental Status: He is alert.      Cranial Nerves: No cranial nerve deficit.      Motor: No abnormal muscle tone.      Coordination: Coordination normal.      Deep Tendon Reflexes: Reflexes are normal and symmetric. Reflexes normal.   Psychiatric:         Behavior: Behavior normal.         Thought Content: Thought content normal.         Judgment: Judgment normal.         Assessment:     1. Sinus pressure     2. Dizziness     3. Agatston CAC score 200-399  rosuvastatin  (CRESTOR) 20 MG Tab   4. Thoracic aortic aneurysm without rupture (HCC)     5. Agatston coronary artery calcium score between 200 and 399  rosuvastatin (CRESTOR) 20 MG Tab   6. Research study patient  rosuvastatin (CRESTOR) 20 MG Tab       Medical Decision Making:  Today's Assessment / Status / Plan:     61-year-old male with a dilated aorta and an elevated calcium score.  I will keep on the rosuvastatin as his cholesterol is at goal.  I do not think his dizziness is related to his heart rate though does have frequent PACs and PVCs.  We will reCT scan his aorta after we see him back in 1 year.

## 2022-10-25 ENCOUNTER — HOSPITAL ENCOUNTER (OUTPATIENT)
Dept: LAB | Facility: MEDICAL CENTER | Age: 62
End: 2022-10-25
Attending: INTERNAL MEDICINE
Payer: COMMERCIAL

## 2022-10-25 LAB
T4 FREE SERPL-MCNC: 1.51 NG/DL (ref 0.93–1.7)
TSH SERPL DL<=0.005 MIU/L-ACNC: 1.77 UIU/ML (ref 0.38–5.33)

## 2022-10-25 PROCEDURE — 36415 COLL VENOUS BLD VENIPUNCTURE: CPT

## 2022-10-25 PROCEDURE — 84443 ASSAY THYROID STIM HORMONE: CPT

## 2022-10-25 PROCEDURE — 84439 ASSAY OF FREE THYROXINE: CPT

## 2023-05-13 ENCOUNTER — HOSPITAL ENCOUNTER (OUTPATIENT)
Dept: LAB | Facility: MEDICAL CENTER | Age: 63
End: 2023-05-13
Attending: INTERNAL MEDICINE
Payer: COMMERCIAL

## 2023-05-13 LAB
T4 FREE SERPL-MCNC: 1.54 NG/DL (ref 0.93–1.7)
TSH SERPL DL<=0.005 MIU/L-ACNC: 1.56 UIU/ML (ref 0.38–5.33)

## 2023-05-13 PROCEDURE — 84443 ASSAY THYROID STIM HORMONE: CPT

## 2023-05-13 PROCEDURE — 36415 COLL VENOUS BLD VENIPUNCTURE: CPT

## 2023-05-13 PROCEDURE — 84439 ASSAY OF FREE THYROXINE: CPT

## 2023-06-01 ENCOUNTER — OFFICE VISIT (OUTPATIENT)
Dept: CARDIOLOGY | Facility: MEDICAL CENTER | Age: 63
End: 2023-06-01
Attending: INTERNAL MEDICINE
Payer: COMMERCIAL

## 2023-06-01 VITALS
BODY MASS INDEX: 24.24 KG/M2 | HEIGHT: 72 IN | RESPIRATION RATE: 16 BRPM | WEIGHT: 179 LBS | DIASTOLIC BLOOD PRESSURE: 64 MMHG | SYSTOLIC BLOOD PRESSURE: 130 MMHG | HEART RATE: 70 BPM | OXYGEN SATURATION: 99 %

## 2023-06-01 DIAGNOSIS — I71.21 ANEURYSM OF ASCENDING AORTA WITHOUT RUPTURE (HCC): ICD-10-CM

## 2023-06-01 DIAGNOSIS — E78.00 PURE HYPERCHOLESTEROLEMIA: ICD-10-CM

## 2023-06-01 DIAGNOSIS — I49.1 PAC (PREMATURE ATRIAL CONTRACTION): ICD-10-CM

## 2023-06-01 DIAGNOSIS — I49.3 PVC'S (PREMATURE VENTRICULAR CONTRACTIONS): ICD-10-CM

## 2023-06-01 DIAGNOSIS — R93.1 AGATSTON CAC SCORE 200-399: ICD-10-CM

## 2023-06-01 PROBLEM — J34.89 SINUS PRESSURE: Status: RESOLVED | Noted: 2021-12-09 | Resolved: 2023-06-01

## 2023-06-01 PROBLEM — I71.20 THORACIC AORTIC ANEURYSM WITHOUT RUPTURE (HCC): Status: RESOLVED | Noted: 2019-12-19 | Resolved: 2023-06-01

## 2023-06-01 PROCEDURE — 3075F SYST BP GE 130 - 139MM HG: CPT | Performed by: INTERNAL MEDICINE

## 2023-06-01 PROCEDURE — 99214 OFFICE O/P EST MOD 30 MIN: CPT | Performed by: INTERNAL MEDICINE

## 2023-06-01 PROCEDURE — 3078F DIAST BP <80 MM HG: CPT | Performed by: INTERNAL MEDICINE

## 2023-06-01 PROCEDURE — 99211 OFF/OP EST MAY X REQ PHY/QHP: CPT | Performed by: INTERNAL MEDICINE

## 2023-06-01 RX ORDER — ROSUVASTATIN CALCIUM 20 MG/1
1 TABLET, COATED ORAL
COMMUNITY
End: 2023-06-01

## 2023-06-01 RX ORDER — ROSUVASTATIN CALCIUM 20 MG/1
20 TABLET, COATED ORAL
Qty: 90 TABLET | Refills: 3 | COMMUNITY
Start: 2023-06-01 | End: 2023-08-22 | Stop reason: SDUPTHER

## 2023-06-01 NOTE — PROGRESS NOTES
Reason for Visit:   Routine follow up    Subjective:   Ilya Rayo is a 61 year-old gentleman who previously followed with Dr. Beckwith for management of ascending aortic aneurysm, advanced coronary artery disease for age (by CT), hyperlipidemia, and frequent PVCs/PACs.  At his last clinic visit with Dr. Beckwith he was concerned that his dizziness might be due to his rosuvastatin, although this was not felt to be the case by Dr. Beckwith.    Today, he reports that he was found to be vitamin B12 deficient and after starting supplementation his dizziness symptoms have been slowly improving.  Otherwise, he has been tolerating rosuvastatin and notes that his dizziness did not stop after holding it for 1 month, therefore, he restarted.  He has no other acute complaints today and notes that he has not been experiencing significant palpitations or racing heart rates.    Allergies   Allergen Reactions    Other Food      [place] , trout      Outpatient Encounter Medications as of 6/1/2023   Medication Sig Dispense Refill    rosuvastatin (CRESTOR) 20 MG Tab Take 1 Tablet by mouth every day. 90 Tablet 3    minocycline (MINOCIN) 100 MG Cap 2 times a day. Prn      [DISCONTINUED] Minocycline HCl 1 MG Misc Minocycline HCl (Patient not taking: Reported on 6/1/2023)      [DISCONTINUED] rosuvastatin (CRESTOR) 20 MG Tab Take 1 Tablet by mouth every day.      [DISCONTINUED] rosuvastatin (CRESTOR) 20 MG Tab Take 1 Tablet by mouth every evening. 90 Tablet 3     No facility-administered encounter medications on file as of 6/1/2023.     Vitals:   /64 (BP Location: Left arm, Patient Position: Sitting)   Pulse 70   Resp 16   Ht 1.829 m (6')   Wt 81.2 kg (179 lb)   SpO2 99%   BMI 24.28 kg/m²     Physical Exam:   General: Well-appearing, no acute distress  Eyes: Extraocular movements intact, anicteric  Neck: Full range of motion, no gross jugular venous distension  Pulmonary: Normal respiratory effort, no distress  Cardiovascular:  Regular rate  Extremities: No gross lower extremity edema  Neurological: Alert and oriented, no gross focal motor deficits  Psychiatric: Normal affect, normal judgment    Laboratories:   Lipids  Lab Results   Component Value Date/Time    LDL 58 06/13/2020 07:49 AM       No results found for: LDLCALC  Lab Results   Component Value Date/Time    HDL 53 06/13/2020 07:49 AM       Lab Results   Component Value Date/Time    TRIGLYCERIDE 60 06/13/2020 07:49 AM       Lab Results   Component Value Date/Time    CHOLSTRLTOT 123 06/13/2020 07:49 AM       Lab Results   Component Value Date/Time    LIPOPROTA <6 06/13/2020 07:49 AM       Chemistries  Lab Results   Component Value Date/Time    CREATININE 1.03 06/13/2020 07:49 AM     Lab Results   Component Value Date/Time    BUN 18 06/13/2020 07:49 AM     Lab Results   Component Value Date/Time    POTASSIUM 4.7 06/13/2020 07:49 AM     Lab Results   Component Value Date/Time    SODIUM 141 06/13/2020 07:49 AM     Lab Results   Component Value Date/Time    GLUCOSE 96 06/13/2020 07:49 AM     No results found for: ASTSGOT  No results found for: ALTSGPT  No results found for: ALKPHOSPHAT  No results found for: HBA1C  No results found for: TSH  No results found for: NTPROBNP  No results found for: TROPONINT    Blood Counts  No results found for: HEMOGLOBIN  No results found for: PLATELETCT  No results found for: WBC    Cardiac Testing:   Cardiac monitor (5/25/2022)  1.  Occasional PVCs (2.9%)  2.  Frequent PACs (20.8%)  3.  Atrial tachycardia, nonsustainted     CT chest (6/18/2020)  1.  Stable ascending aortic aneurysm (4.2 x 4.1 cm).   2.  Atherosclerosis.    CAC (12/10/2018)  LMA - 0   LCX - 60.8   LAD - 163.8   RCA - 0   PDA - 0   Calcium score: 224.6 (84th% for age)    TTE (8/9/2019)  Normal left ventricular systolic function.  Left ventricular ejection fraction is visually estimated to be 65%.  No significant valve abnormalities.     Medical Decision Making:    # Coronary artery  disease, hyperlipidemia: He has been tolerating moderate dose rosuvastatin with a reasonable LDL in the past.  -Repeat fasting lipids  -Continue rosuvastatin 20 mg for now    # Ascending aortic aneurysm:  -Surveillance CTA    # Occasional PVCs, frequent PACs: Currently he reports minimal symptoms.  I did recommend screening for sleep apnea, however, he declined for now.  He did deny significant snoring.  -Surveillance echocardiogram

## 2023-06-05 ENCOUNTER — TELEPHONE (OUTPATIENT)
Dept: CARDIOLOGY | Facility: MEDICAL CENTER | Age: 63
End: 2023-06-05
Payer: COMMERCIAL

## 2023-06-05 DIAGNOSIS — Z01.812 PRE-PROCEDURE LAB EXAM: ICD-10-CM

## 2023-06-05 NOTE — TELEPHONE ENCOUNTER
----- Message from Geraldo Choe M.D. sent at 6/5/2023 12:27 PM PDT -----  Regarding: RE: CTA  Christianne,    Can you send in a BMP for him and make sure he knows to get his labs done a few days prior to his CT scan.  Thanks.    ----- Message -----  From: Cherelle Tuttle  Sent: 6/2/2023   1:08 PM PDT  To: Geraldo Choe M.D.  Subject: CTA                                              Patient will need labs prior to exam, either CMP or GFR. Thank you!

## 2023-06-20 ENCOUNTER — APPOINTMENT (RX ONLY)
Dept: URBAN - METROPOLITAN AREA CLINIC 6 | Facility: CLINIC | Age: 63
Setting detail: DERMATOLOGY
End: 2023-06-20

## 2023-06-20 DIAGNOSIS — Z71.89 OTHER SPECIFIED COUNSELING: ICD-10-CM

## 2023-06-20 DIAGNOSIS — L30.9 DERMATITIS, UNSPECIFIED: ICD-10-CM

## 2023-06-20 DIAGNOSIS — L82.1 OTHER SEBORRHEIC KERATOSIS: ICD-10-CM

## 2023-06-20 PROCEDURE — ? ADDITIONAL NOTES

## 2023-06-20 PROCEDURE — ? COUNSELING

## 2023-06-20 PROCEDURE — ? PRESCRIPTION

## 2023-06-20 PROCEDURE — 99204 OFFICE O/P NEW MOD 45 MIN: CPT

## 2023-06-20 PROCEDURE — ? DIAGNOSIS COMMENT

## 2023-06-20 RX ORDER — MINOCYCLINE HYDROCHLORIDE 100 MG/1
CAPSULE ORAL BID
Qty: 60 | Refills: 0 | Status: ERX | COMMUNITY
Start: 2023-06-20

## 2023-06-20 RX ADMIN — MINOCYCLINE HYDROCHLORIDE: 100 CAPSULE ORAL at 00:00

## 2023-06-20 ASSESSMENT — LOCATION DETAILED DESCRIPTION DERM
LOCATION DETAILED: RIGHT CENTRAL MALAR CHEEK
LOCATION DETAILED: RIGHT LATERAL TEMPLE
LOCATION DETAILED: LEFT CENTRAL MALAR CHEEK
LOCATION DETAILED: LEFT MEDIAL MALAR CHEEK
LOCATION DETAILED: LEFT SUPERIOR PARIETAL SCALP
LOCATION DETAILED: LEFT CENTRAL TEMPLE
LOCATION DETAILED: LEFT INFERIOR FOREHEAD

## 2023-06-20 ASSESSMENT — LOCATION SIMPLE DESCRIPTION DERM
LOCATION SIMPLE: LEFT TEMPLE
LOCATION SIMPLE: RIGHT TEMPLE
LOCATION SIMPLE: LEFT FOREHEAD
LOCATION SIMPLE: RIGHT CHEEK
LOCATION SIMPLE: SCALP
LOCATION SIMPLE: LEFT CHEEK

## 2023-06-20 ASSESSMENT — LOCATION ZONE DERM
LOCATION ZONE: FACE
LOCATION ZONE: SCALP

## 2023-06-20 NOTE — PROCEDURE: DIAGNOSIS COMMENT
Comment: Pt states that he was diagnosed with rosacea and minocycline 100mg bid is the only thing that works.\\nReports that “pustules “ appear and then are gone sometimes within a day. He reports that he gets “ pustules “ on his scalp on occasion as well. \\nI have requested that he come in when rash is present so I can confirm diagnosis. Given history uncertain if this is rosacea.
Detail Level: Simple
Render Risk Assessment In Note?: no

## 2023-06-20 NOTE — PROCEDURE: ADDITIONAL NOTES
Detail Level: Simple
Additional Notes: Pt refuses an educational materials on rosacea. Request minocycline be in capsule form.
Render Risk Assessment In Note?: no

## 2023-06-22 ENCOUNTER — HOSPITAL ENCOUNTER (OUTPATIENT)
Dept: LAB | Facility: MEDICAL CENTER | Age: 63
End: 2023-06-22
Attending: INTERNAL MEDICINE
Payer: COMMERCIAL

## 2023-06-22 DIAGNOSIS — Z01.812 PRE-PROCEDURE LAB EXAM: ICD-10-CM

## 2023-06-22 DIAGNOSIS — R93.1 AGATSTON CAC SCORE 200-399: ICD-10-CM

## 2023-06-22 LAB
ANION GAP SERPL CALC-SCNC: 12 MMOL/L (ref 7–16)
BUN SERPL-MCNC: 20 MG/DL (ref 8–22)
CALCIUM SERPL-MCNC: 9.8 MG/DL (ref 8.5–10.5)
CHLORIDE SERPL-SCNC: 108 MMOL/L (ref 96–112)
CHOLEST SERPL-MCNC: 138 MG/DL (ref 100–199)
CO2 SERPL-SCNC: 26 MMOL/L (ref 20–33)
CREAT SERPL-MCNC: 0.94 MG/DL (ref 0.5–1.4)
GFR SERPLBLD CREATININE-BSD FMLA CKD-EPI: 91 ML/MIN/1.73 M 2
GLUCOSE SERPL-MCNC: 99 MG/DL (ref 65–99)
HDLC SERPL-MCNC: 62 MG/DL
LDLC SERPL CALC-MCNC: 62 MG/DL
POTASSIUM SERPL-SCNC: 5.2 MMOL/L (ref 3.6–5.5)
SODIUM SERPL-SCNC: 146 MMOL/L (ref 135–145)
TRIGL SERPL-MCNC: 71 MG/DL (ref 0–149)

## 2023-06-22 PROCEDURE — 80061 LIPID PANEL: CPT

## 2023-06-22 PROCEDURE — 80048 BASIC METABOLIC PNL TOTAL CA: CPT

## 2023-06-22 PROCEDURE — 36415 COLL VENOUS BLD VENIPUNCTURE: CPT

## 2023-06-27 ENCOUNTER — HOSPITAL ENCOUNTER (OUTPATIENT)
Dept: RADIOLOGY | Facility: MEDICAL CENTER | Age: 63
End: 2023-06-27
Attending: INTERNAL MEDICINE
Payer: COMMERCIAL

## 2023-06-27 DIAGNOSIS — I71.21 ANEURYSM OF ASCENDING AORTA WITHOUT RUPTURE (HCC): ICD-10-CM

## 2023-06-27 PROCEDURE — 71275 CT ANGIOGRAPHY CHEST: CPT

## 2023-06-27 PROCEDURE — 700117 HCHG RX CONTRAST REV CODE 255: Performed by: INTERNAL MEDICINE

## 2023-06-27 RX ADMIN — IOHEXOL 100 ML: 350 INJECTION, SOLUTION INTRAVENOUS at 11:12

## 2023-08-22 DIAGNOSIS — R93.1 AGATSTON CAC SCORE 200-399: ICD-10-CM

## 2023-08-22 NOTE — TELEPHONE ENCOUNTER
Is the patient due for a refill? Yes- original not sent electronically     Was the patient seen the past year? Yes    Date of last office visit: 6/1/2023    Does the patient have an upcoming appointment?  No   If yes, When?     Provider to refill:BN    Does the patients insurance require a 100 day supply?  No

## 2023-08-23 RX ORDER — ROSUVASTATIN CALCIUM 20 MG/1
20 TABLET, COATED ORAL
Qty: 90 TABLET | Refills: 2 | Status: SHIPPED | OUTPATIENT
Start: 2023-08-23

## 2023-09-25 ENCOUNTER — HOSPITAL ENCOUNTER (OUTPATIENT)
Dept: CARDIOLOGY | Facility: MEDICAL CENTER | Age: 63
End: 2023-09-25
Attending: INTERNAL MEDICINE
Payer: COMMERCIAL

## 2023-09-25 DIAGNOSIS — I71.21 ANEURYSM OF ASCENDING AORTA WITHOUT RUPTURE (HCC): ICD-10-CM

## 2023-09-25 LAB
LV EJECT FRACT  99904: 60
LV EJECT FRACT MOD 2C 99903: 68.33
LV EJECT FRACT MOD 4C 99902: 65.46
LV EJECT FRACT MOD BP 99901: 68.44

## 2023-09-25 PROCEDURE — 93306 TTE W/DOPPLER COMPLETE: CPT

## 2023-09-25 PROCEDURE — 93306 TTE W/DOPPLER COMPLETE: CPT | Mod: 26 | Performed by: INTERNAL MEDICINE

## 2024-05-03 ENCOUNTER — APPOINTMENT (OUTPATIENT)
Dept: LAB | Facility: MEDICAL CENTER | Age: 64
End: 2024-05-03
Payer: COMMERCIAL

## 2024-05-03 LAB
T4 FREE SERPL-MCNC: 1.61 NG/DL (ref 0.93–1.7)
TSH SERPL DL<=0.005 MIU/L-ACNC: 1.82 UIU/ML (ref 0.38–5.33)

## 2024-05-23 DIAGNOSIS — R93.1 AGATSTON CAC SCORE 200-399: ICD-10-CM

## 2024-05-24 RX ORDER — ROSUVASTATIN CALCIUM 20 MG/1
20 TABLET, COATED ORAL EVERY EVENING
Qty: 90 TABLET | Refills: 0 | Status: SHIPPED | OUTPATIENT
Start: 2024-05-24

## 2024-05-24 NOTE — TELEPHONE ENCOUNTER
Is the patient due for a refill? Yes    Was the patient seen the past year? Yes    Date of last office visit: 6/01/23    Does the patient have an upcoming appointment?  No    Provider to refill:BN    Does the patients insurance require a 100 day supply?  No

## 2024-07-23 ENCOUNTER — OFFICE VISIT (OUTPATIENT)
Dept: CARDIOLOGY | Facility: MEDICAL CENTER | Age: 64
End: 2024-07-23
Attending: INTERNAL MEDICINE
Payer: COMMERCIAL

## 2024-07-23 VITALS
BODY MASS INDEX: 24.52 KG/M2 | RESPIRATION RATE: 12 BRPM | WEIGHT: 181 LBS | HEIGHT: 72 IN | OXYGEN SATURATION: 99 % | HEART RATE: 70 BPM | SYSTOLIC BLOOD PRESSURE: 118 MMHG | DIASTOLIC BLOOD PRESSURE: 72 MMHG

## 2024-07-23 DIAGNOSIS — R00.2 PALPITATIONS: ICD-10-CM

## 2024-07-23 DIAGNOSIS — E04.2 MULTIPLE THYROID NODULES: ICD-10-CM

## 2024-07-23 DIAGNOSIS — I70.0 AORTIC ATHEROSCLEROSIS (HCC): ICD-10-CM

## 2024-07-23 DIAGNOSIS — R93.1 AGATSTON CAC SCORE 200-399: ICD-10-CM

## 2024-07-23 DIAGNOSIS — I71.21 ANEURYSM OF ASCENDING AORTA WITHOUT RUPTURE (HCC): ICD-10-CM

## 2024-07-23 DIAGNOSIS — E78.00 PURE HYPERCHOLESTEROLEMIA: ICD-10-CM

## 2024-07-23 PROCEDURE — 3078F DIAST BP <80 MM HG: CPT | Performed by: INTERNAL MEDICINE

## 2024-07-23 PROCEDURE — 93005 ELECTROCARDIOGRAM TRACING: CPT | Performed by: INTERNAL MEDICINE

## 2024-07-23 PROCEDURE — 99211 OFF/OP EST MAY X REQ PHY/QHP: CPT | Performed by: INTERNAL MEDICINE

## 2024-07-23 PROCEDURE — 99214 OFFICE O/P EST MOD 30 MIN: CPT | Performed by: INTERNAL MEDICINE

## 2024-07-23 PROCEDURE — 3074F SYST BP LT 130 MM HG: CPT | Performed by: INTERNAL MEDICINE

## 2024-07-23 PROCEDURE — G2211 COMPLEX E/M VISIT ADD ON: HCPCS | Performed by: INTERNAL MEDICINE

## 2024-07-23 RX ORDER — ROSUVASTATIN CALCIUM 20 MG/1
20 TABLET, COATED ORAL EVERY EVENING
Qty: 90 TABLET | Refills: 3 | Status: SHIPPED | OUTPATIENT
Start: 2024-07-23

## 2024-07-24 LAB — EKG IMPRESSION: NORMAL

## 2024-07-24 PROCEDURE — 93010 ELECTROCARDIOGRAM REPORT: CPT | Performed by: INTERNAL MEDICINE

## 2024-07-29 ENCOUNTER — HOSPITAL ENCOUNTER (OUTPATIENT)
Dept: LAB | Facility: MEDICAL CENTER | Age: 64
End: 2024-07-29
Attending: INTERNAL MEDICINE
Payer: COMMERCIAL

## 2024-07-29 DIAGNOSIS — I71.21 ANEURYSM OF ASCENDING AORTA WITHOUT RUPTURE (HCC): ICD-10-CM

## 2024-07-29 DIAGNOSIS — E78.00 PURE HYPERCHOLESTEROLEMIA: ICD-10-CM

## 2024-07-29 LAB
ALBUMIN SERPL BCP-MCNC: 4.3 G/DL (ref 3.2–4.9)
ALBUMIN/GLOB SERPL: 2 G/DL
ALP SERPL-CCNC: 63 U/L (ref 30–99)
ALT SERPL-CCNC: 12 U/L (ref 2–50)
ANION GAP SERPL CALC-SCNC: 11 MMOL/L (ref 7–16)
AST SERPL-CCNC: 20 U/L (ref 12–45)
BASOPHILS # BLD AUTO: 0.7 % (ref 0–1.8)
BASOPHILS # BLD: 0.05 K/UL (ref 0–0.12)
BILIRUB SERPL-MCNC: 0.9 MG/DL (ref 0.1–1.5)
BUN SERPL-MCNC: 20 MG/DL (ref 8–22)
CALCIUM ALBUM COR SERPL-MCNC: 9.3 MG/DL (ref 8.5–10.5)
CALCIUM SERPL-MCNC: 9.5 MG/DL (ref 8.5–10.5)
CHLORIDE SERPL-SCNC: 107 MMOL/L (ref 96–112)
CHOLEST SERPL-MCNC: 141 MG/DL (ref 100–199)
CO2 SERPL-SCNC: 24 MMOL/L (ref 20–33)
CREAT SERPL-MCNC: 0.77 MG/DL (ref 0.5–1.4)
EOSINOPHIL # BLD AUTO: 0.12 K/UL (ref 0–0.51)
EOSINOPHIL NFR BLD: 1.7 % (ref 0–6.9)
ERYTHROCYTE [DISTWIDTH] IN BLOOD BY AUTOMATED COUNT: 46.5 FL (ref 35.9–50)
FASTING STATUS PATIENT QL REPORTED: NORMAL
GFR SERPLBLD CREATININE-BSD FMLA CKD-EPI: 99 ML/MIN/1.73 M 2
GLOBULIN SER CALC-MCNC: 2.2 G/DL (ref 1.9–3.5)
GLUCOSE SERPL-MCNC: 88 MG/DL (ref 65–99)
HCT VFR BLD AUTO: 46.9 % (ref 42–52)
HDLC SERPL-MCNC: 52 MG/DL
HGB BLD-MCNC: 15.7 G/DL (ref 14–18)
IMM GRANULOCYTES # BLD AUTO: 0.02 K/UL (ref 0–0.11)
IMM GRANULOCYTES NFR BLD AUTO: 0.3 % (ref 0–0.9)
LDLC SERPL CALC-MCNC: 73 MG/DL
LYMPHOCYTES # BLD AUTO: 1.35 K/UL (ref 1–4.8)
LYMPHOCYTES NFR BLD: 18.6 % (ref 22–41)
MCH RBC QN AUTO: 32.4 PG (ref 27–33)
MCHC RBC AUTO-ENTMCNC: 33.5 G/DL (ref 32.3–36.5)
MCV RBC AUTO: 96.9 FL (ref 81.4–97.8)
MONOCYTES # BLD AUTO: 0.55 K/UL (ref 0–0.85)
MONOCYTES NFR BLD AUTO: 7.6 % (ref 0–13.4)
NEUTROPHILS # BLD AUTO: 5.16 K/UL (ref 1.82–7.42)
NEUTROPHILS NFR BLD: 71.1 % (ref 44–72)
NRBC # BLD AUTO: 0 K/UL
NRBC BLD-RTO: 0 /100 WBC (ref 0–0.2)
PLATELET # BLD AUTO: 254 K/UL (ref 164–446)
PMV BLD AUTO: 10.2 FL (ref 9–12.9)
POTASSIUM SERPL-SCNC: 4.8 MMOL/L (ref 3.6–5.5)
PROT SERPL-MCNC: 6.5 G/DL (ref 6–8.2)
RBC # BLD AUTO: 4.84 M/UL (ref 4.7–6.1)
SODIUM SERPL-SCNC: 142 MMOL/L (ref 135–145)
TRIGL SERPL-MCNC: 79 MG/DL (ref 0–149)
TSH SERPL DL<=0.005 MIU/L-ACNC: 1.08 UIU/ML (ref 0.38–5.33)
WBC # BLD AUTO: 7.3 K/UL (ref 4.8–10.8)

## 2024-07-29 PROCEDURE — 84443 ASSAY THYROID STIM HORMONE: CPT

## 2024-07-29 PROCEDURE — 36415 COLL VENOUS BLD VENIPUNCTURE: CPT

## 2024-07-29 PROCEDURE — 80061 LIPID PANEL: CPT

## 2024-07-29 PROCEDURE — 80053 COMPREHEN METABOLIC PANEL: CPT

## 2024-07-29 PROCEDURE — 85025 COMPLETE CBC W/AUTO DIFF WBC: CPT

## 2024-08-01 ENCOUNTER — APPOINTMENT (OUTPATIENT)
Dept: RADIOLOGY | Facility: MEDICAL CENTER | Age: 64
End: 2024-08-01
Attending: INTERNAL MEDICINE
Payer: COMMERCIAL

## 2024-08-01 DIAGNOSIS — I71.21 ANEURYSM OF ASCENDING AORTA WITHOUT RUPTURE (HCC): ICD-10-CM

## 2024-08-01 PROCEDURE — 71275 CT ANGIOGRAPHY CHEST: CPT

## 2024-08-01 PROCEDURE — 700117 HCHG RX CONTRAST REV CODE 255: Performed by: INTERNAL MEDICINE

## 2024-08-01 RX ADMIN — IOHEXOL 100 ML: 350 INJECTION, SOLUTION INTRAVENOUS at 15:07

## 2024-08-05 ENCOUNTER — PATIENT MESSAGE (OUTPATIENT)
Dept: CARDIOLOGY | Facility: MEDICAL CENTER | Age: 64
End: 2024-08-05
Payer: COMMERCIAL

## 2024-09-03 ENCOUNTER — APPOINTMENT (OUTPATIENT)
Dept: RADIOLOGY | Facility: MEDICAL CENTER | Age: 64
End: 2024-09-03
Attending: ORTHOPAEDIC SURGERY
Payer: COMMERCIAL

## 2024-09-03 DIAGNOSIS — M17.11 PRIMARY OSTEOARTHRITIS OF RIGHT KNEE: ICD-10-CM

## 2024-09-03 PROCEDURE — 73721 MRI JNT OF LWR EXTRE W/O DYE: CPT

## 2024-12-26 ENCOUNTER — HOSPITAL ENCOUNTER (OUTPATIENT)
Dept: RADIOLOGY | Facility: MEDICAL CENTER | Age: 64
End: 2024-12-26
Attending: ANESTHESIOLOGY
Payer: COMMERCIAL

## 2024-12-26 DIAGNOSIS — M54.16 LUMBAR RADICULOPATHY: ICD-10-CM

## 2024-12-26 DIAGNOSIS — M25.551 RIGHT HIP PAIN: ICD-10-CM

## 2024-12-26 PROCEDURE — 72148 MRI LUMBAR SPINE W/O DYE: CPT

## 2024-12-26 PROCEDURE — 73501 X-RAY EXAM HIP UNI 1 VIEW: CPT | Mod: RT

## 2025-08-18 ENCOUNTER — NON-PROVIDER VISIT (OUTPATIENT)
Dept: CARDIOLOGY | Facility: MEDICAL CENTER | Age: 65
End: 2025-08-18
Attending: INTERNAL MEDICINE
Payer: COMMERCIAL

## 2025-08-18 VITALS
RESPIRATION RATE: 18 BRPM | HEIGHT: 72 IN | DIASTOLIC BLOOD PRESSURE: 80 MMHG | OXYGEN SATURATION: 98 % | WEIGHT: 180 LBS | BODY MASS INDEX: 24.38 KG/M2 | SYSTOLIC BLOOD PRESSURE: 132 MMHG | HEART RATE: 63 BPM

## 2025-08-18 DIAGNOSIS — R00.2 PALPITATIONS: ICD-10-CM

## 2025-08-18 DIAGNOSIS — E78.00 PURE HYPERCHOLESTEROLEMIA: ICD-10-CM

## 2025-08-18 DIAGNOSIS — I49.1 PAC (PREMATURE ATRIAL CONTRACTION): ICD-10-CM

## 2025-08-18 DIAGNOSIS — R93.1 AGATSTON CAC SCORE 200-399: ICD-10-CM

## 2025-08-18 DIAGNOSIS — I49.3 PVC'S (PREMATURE VENTRICULAR CONTRACTIONS): ICD-10-CM

## 2025-08-18 DIAGNOSIS — I71.21 ANEURYSM OF ASCENDING AORTA WITHOUT RUPTURE (HCC): Primary | ICD-10-CM

## 2025-08-18 PROCEDURE — 3075F SYST BP GE 130 - 139MM HG: CPT | Performed by: INTERNAL MEDICINE

## 2025-08-18 PROCEDURE — 93246 EXT ECG>7D<15D RECORDING: CPT

## 2025-08-18 PROCEDURE — 99214 OFFICE O/P EST MOD 30 MIN: CPT | Performed by: INTERNAL MEDICINE

## 2025-08-18 PROCEDURE — 3079F DIAST BP 80-89 MM HG: CPT | Performed by: INTERNAL MEDICINE

## 2025-08-18 PROCEDURE — G2211 COMPLEX E/M VISIT ADD ON: HCPCS | Performed by: INTERNAL MEDICINE

## 2025-08-18 ASSESSMENT — FIBROSIS 4 INDEX: FIB4 SCORE: 1.5

## 2025-08-25 DIAGNOSIS — R93.1 AGATSTON CAC SCORE 200-399: ICD-10-CM

## 2025-08-25 RX ORDER — ROSUVASTATIN CALCIUM 20 MG/1
20 TABLET, COATED ORAL EVERY EVENING
Qty: 90 TABLET | Refills: 3 | Status: SHIPPED | OUTPATIENT
Start: 2025-08-25

## 2025-08-27 ENCOUNTER — HOSPITAL ENCOUNTER (OUTPATIENT)
Dept: CARDIOLOGY | Facility: MEDICAL CENTER | Age: 65
End: 2025-08-27
Attending: INTERNAL MEDICINE
Payer: COMMERCIAL

## 2025-08-27 DIAGNOSIS — I71.21 ANEURYSM OF ASCENDING AORTA WITHOUT RUPTURE (HCC): ICD-10-CM

## 2025-08-27 PROCEDURE — 93306 TTE W/DOPPLER COMPLETE: CPT

## 2025-08-28 LAB
LV EJECT FRACT MOD 2C 99903: 63.04
LV EJECT FRACT MOD 4C 99902: 60.7
LV EJECT FRACT MOD BP 99901: 62.89

## 2025-08-28 PROCEDURE — 93306 TTE W/DOPPLER COMPLETE: CPT | Mod: 26 | Performed by: INTERNAL MEDICINE

## 2025-08-30 ENCOUNTER — HOSPITAL ENCOUNTER (OUTPATIENT)
Dept: LAB | Facility: MEDICAL CENTER | Age: 65
End: 2025-08-30
Attending: INTERNAL MEDICINE
Payer: COMMERCIAL

## 2025-08-30 DIAGNOSIS — I71.21 ANEURYSM OF ASCENDING AORTA WITHOUT RUPTURE (HCC): ICD-10-CM

## 2025-08-30 LAB
ANION GAP SERPL CALC-SCNC: 10 MMOL/L (ref 7–16)
BUN SERPL-MCNC: 18 MG/DL (ref 8–22)
CALCIUM SERPL-MCNC: 9.1 MG/DL (ref 8.5–10.5)
CHLORIDE SERPL-SCNC: 107 MMOL/L (ref 96–112)
CO2 SERPL-SCNC: 24 MMOL/L (ref 20–33)
CREAT SERPL-MCNC: 1.04 MG/DL (ref 0.5–1.4)
GFR SERPLBLD CREATININE-BSD FMLA CKD-EPI: 79 ML/MIN/1.73 M 2
GLUCOSE SERPL-MCNC: 90 MG/DL (ref 65–99)
POTASSIUM SERPL-SCNC: 4.8 MMOL/L (ref 3.6–5.5)
SODIUM SERPL-SCNC: 141 MMOL/L (ref 135–145)

## 2025-08-30 PROCEDURE — 80048 BASIC METABOLIC PNL TOTAL CA: CPT

## 2025-08-30 PROCEDURE — 36415 COLL VENOUS BLD VENIPUNCTURE: CPT
